# Patient Record
Sex: FEMALE | Race: WHITE | NOT HISPANIC OR LATINO | Employment: FULL TIME
[De-identification: names, ages, dates, MRNs, and addresses within clinical notes are randomized per-mention and may not be internally consistent; named-entity substitution may affect disease eponyms.]

---

## 2017-01-04 ENCOUNTER — RECORDS - HEALTHEAST (OUTPATIENT)
Dept: ADMINISTRATIVE | Facility: OTHER | Age: 47
End: 2017-01-04

## 2017-01-04 LAB
HPV_EXT - HISTORICAL: NORMAL
PAP SMEAR - HIM PATIENT REPORTED: NORMAL

## 2017-01-05 ENCOUNTER — RECORDS - HEALTHEAST (OUTPATIENT)
Dept: ADMINISTRATIVE | Facility: OTHER | Age: 47
End: 2017-01-05

## 2017-01-18 ENCOUNTER — HOSPITAL ENCOUNTER (OUTPATIENT)
Dept: MAMMOGRAPHY | Facility: CLINIC | Age: 47
Discharge: HOME | End: 2017-01-18
Attending: OBSTETRICS & GYNECOLOGY

## 2017-01-18 DIAGNOSIS — Z12.31 VISIT FOR SCREENING MAMMOGRAM: ICD-10-CM

## 2017-02-05 ENCOUNTER — COMMUNICATION - HEALTHEAST (OUTPATIENT)
Dept: FAMILY MEDICINE | Facility: CLINIC | Age: 47
End: 2017-02-05

## 2017-02-05 DIAGNOSIS — J45.30 MILD PERSISTENT ASTHMA, UNCOMPLICATED: ICD-10-CM

## 2017-02-17 ENCOUNTER — OFFICE VISIT - HEALTHEAST (OUTPATIENT)
Dept: FAMILY MEDICINE | Facility: CLINIC | Age: 47
End: 2017-02-17

## 2017-02-17 DIAGNOSIS — J45.30 MILD PERSISTENT ASTHMA WITHOUT COMPLICATION: ICD-10-CM

## 2017-02-17 DIAGNOSIS — M79.604 PAIN OF BACK AND RIGHT LOWER EXTREMITY: ICD-10-CM

## 2017-02-17 DIAGNOSIS — G43.809 OTHER MIGRAINE WITHOUT STATUS MIGRAINOSUS, NOT INTRACTABLE: ICD-10-CM

## 2017-02-17 DIAGNOSIS — M54.9 PAIN OF BACK AND RIGHT LOWER EXTREMITY: ICD-10-CM

## 2017-02-17 ASSESSMENT — MIFFLIN-ST. JEOR: SCORE: 1066.28

## 2017-10-06 ENCOUNTER — RECORDS - HEALTHEAST (OUTPATIENT)
Dept: ADMINISTRATIVE | Facility: OTHER | Age: 47
End: 2017-10-06

## 2018-01-31 ENCOUNTER — OFFICE VISIT - HEALTHEAST (OUTPATIENT)
Dept: FAMILY MEDICINE | Facility: CLINIC | Age: 48
End: 2018-01-31

## 2018-01-31 DIAGNOSIS — E78.00 HYPERCHOLESTEREMIA: ICD-10-CM

## 2018-01-31 DIAGNOSIS — Z12.31 VISIT FOR SCREENING MAMMOGRAM: ICD-10-CM

## 2018-01-31 DIAGNOSIS — J45.30 MILD PERSISTENT ASTHMA WITHOUT COMPLICATION: ICD-10-CM

## 2018-01-31 DIAGNOSIS — G47.09 OTHER INSOMNIA: ICD-10-CM

## 2018-01-31 DIAGNOSIS — Z12.11 SCREEN FOR COLON CANCER: ICD-10-CM

## 2018-04-06 ENCOUNTER — RECORDS - HEALTHEAST (OUTPATIENT)
Dept: ADMINISTRATIVE | Facility: OTHER | Age: 48
End: 2018-04-06

## 2018-09-11 ENCOUNTER — COMMUNICATION - HEALTHEAST (OUTPATIENT)
Dept: FAMILY MEDICINE | Facility: CLINIC | Age: 48
End: 2018-09-11

## 2018-10-12 ENCOUNTER — RECORDS - HEALTHEAST (OUTPATIENT)
Dept: ADMINISTRATIVE | Facility: OTHER | Age: 48
End: 2018-10-12

## 2019-01-11 ENCOUNTER — RECORDS - HEALTHEAST (OUTPATIENT)
Dept: ADMINISTRATIVE | Facility: OTHER | Age: 49
End: 2019-01-11

## 2019-01-24 ENCOUNTER — OFFICE VISIT - HEALTHEAST (OUTPATIENT)
Dept: FAMILY MEDICINE | Facility: CLINIC | Age: 49
End: 2019-01-24

## 2019-01-24 DIAGNOSIS — E78.00 HYPERCHOLESTEREMIA: ICD-10-CM

## 2019-01-24 DIAGNOSIS — J45.30 MILD PERSISTENT ASTHMA WITHOUT COMPLICATION: ICD-10-CM

## 2019-01-24 DIAGNOSIS — F10.21 ALCOHOL DEPENDENCE, IN REMISSION (H): ICD-10-CM

## 2019-01-24 DIAGNOSIS — R79.89 ABNORMAL LFTS: ICD-10-CM

## 2019-01-24 DIAGNOSIS — G43.809 OTHER MIGRAINE WITHOUT STATUS MIGRAINOSUS, NOT INTRACTABLE: ICD-10-CM

## 2019-01-24 LAB
ALBUMIN SERPL-MCNC: 4.3 G/DL (ref 3.5–5)
ALP SERPL-CCNC: 72 U/L (ref 45–120)
ALT SERPL W P-5'-P-CCNC: 19 U/L (ref 0–45)
ANION GAP SERPL CALCULATED.3IONS-SCNC: 12 MMOL/L (ref 5–18)
AST SERPL W P-5'-P-CCNC: 22 U/L (ref 0–40)
BILIRUB SERPL-MCNC: 0.5 MG/DL (ref 0–1)
BUN SERPL-MCNC: 20 MG/DL (ref 8–22)
CALCIUM SERPL-MCNC: 9.6 MG/DL (ref 8.5–10.5)
CHLORIDE BLD-SCNC: 104 MMOL/L (ref 98–107)
CHOLEST SERPL-MCNC: 236 MG/DL
CO2 SERPL-SCNC: 25 MMOL/L (ref 22–31)
CREAT SERPL-MCNC: 0.84 MG/DL (ref 0.6–1.1)
ERYTHROCYTE [DISTWIDTH] IN BLOOD BY AUTOMATED COUNT: 12.3 % (ref 11–14.5)
FASTING STATUS PATIENT QL REPORTED: NO
GFR SERPL CREATININE-BSD FRML MDRD: >60 ML/MIN/1.73M2
GLUCOSE BLD-MCNC: 99 MG/DL (ref 70–125)
HCT VFR BLD AUTO: 41.2 % (ref 35–47)
HDLC SERPL-MCNC: 75 MG/DL
HGB BLD-MCNC: 13.4 G/DL (ref 12–16)
LDLC SERPL CALC-MCNC: 151 MG/DL
MCH RBC QN AUTO: 31.1 PG (ref 27–34)
MCHC RBC AUTO-ENTMCNC: 32.6 G/DL (ref 32–36)
MCV RBC AUTO: 95 FL (ref 80–100)
PLATELET # BLD AUTO: 347 THOU/UL (ref 140–440)
PMV BLD AUTO: 7.6 FL (ref 7–10)
POTASSIUM BLD-SCNC: 4.1 MMOL/L (ref 3.5–5)
PROT SERPL-MCNC: 7 G/DL (ref 6–8)
RBC # BLD AUTO: 4.32 MILL/UL (ref 3.8–5.4)
SODIUM SERPL-SCNC: 141 MMOL/L (ref 136–145)
TRIGL SERPL-MCNC: 49 MG/DL
WBC: 4.7 THOU/UL (ref 4–11)

## 2019-01-26 ENCOUNTER — COMMUNICATION - HEALTHEAST (OUTPATIENT)
Dept: FAMILY MEDICINE | Facility: CLINIC | Age: 49
End: 2019-01-26

## 2019-01-29 ENCOUNTER — COMMUNICATION - HEALTHEAST (OUTPATIENT)
Dept: FAMILY MEDICINE | Facility: CLINIC | Age: 49
End: 2019-01-29

## 2019-02-04 ENCOUNTER — RECORDS - HEALTHEAST (OUTPATIENT)
Dept: ADMINISTRATIVE | Facility: OTHER | Age: 49
End: 2019-02-04

## 2019-02-05 ENCOUNTER — HOSPITAL ENCOUNTER (OUTPATIENT)
Dept: MAMMOGRAPHY | Facility: CLINIC | Age: 49
Discharge: HOME | End: 2019-02-05
Attending: OBSTETRICS & GYNECOLOGY

## 2019-02-05 DIAGNOSIS — Z12.31 VISIT FOR SCREENING MAMMOGRAM: ICD-10-CM

## 2019-02-06 ENCOUNTER — COMMUNICATION - HEALTHEAST (OUTPATIENT)
Dept: MAMMOGRAPHY | Facility: CLINIC | Age: 49
End: 2019-02-06

## 2019-02-06 ENCOUNTER — RECORDS - HEALTHEAST (OUTPATIENT)
Dept: ADMINISTRATIVE | Facility: OTHER | Age: 49
End: 2019-02-06

## 2019-02-07 ENCOUNTER — HOSPITAL ENCOUNTER (OUTPATIENT)
Dept: MAMMOGRAPHY | Facility: CLINIC | Age: 49
Discharge: HOME | End: 2019-02-07
Attending: OBSTETRICS & GYNECOLOGY

## 2019-02-07 DIAGNOSIS — N64.89 BREAST ASYMMETRY: ICD-10-CM

## 2019-02-18 ENCOUNTER — RECORDS - HEALTHEAST (OUTPATIENT)
Dept: ADMINISTRATIVE | Facility: OTHER | Age: 49
End: 2019-02-18

## 2019-04-08 ENCOUNTER — COMMUNICATION - HEALTHEAST (OUTPATIENT)
Dept: FAMILY MEDICINE | Facility: CLINIC | Age: 49
End: 2019-04-08

## 2019-04-08 DIAGNOSIS — J45.30 MILD PERSISTENT ASTHMA WITHOUT COMPLICATION: ICD-10-CM

## 2019-04-12 ENCOUNTER — RECORDS - HEALTHEAST (OUTPATIENT)
Dept: ADMINISTRATIVE | Facility: OTHER | Age: 49
End: 2019-04-12

## 2019-06-04 ENCOUNTER — OFFICE VISIT - HEALTHEAST (OUTPATIENT)
Dept: FAMILY MEDICINE | Facility: CLINIC | Age: 49
End: 2019-06-04

## 2019-06-04 DIAGNOSIS — M25.551 HIP PAIN, RIGHT: ICD-10-CM

## 2019-06-04 DIAGNOSIS — F10.21 ALCOHOL DEPENDENCE, IN REMISSION (H): ICD-10-CM

## 2019-06-04 DIAGNOSIS — G43.809 OTHER MIGRAINE WITHOUT STATUS MIGRAINOSUS, NOT INTRACTABLE: ICD-10-CM

## 2019-06-04 DIAGNOSIS — J45.30 MILD PERSISTENT ASTHMA WITHOUT COMPLICATION: ICD-10-CM

## 2019-06-04 DIAGNOSIS — Z01.810 PREOP CARDIOVASCULAR EXAM: ICD-10-CM

## 2019-06-04 ASSESSMENT — MIFFLIN-ST. JEOR: SCORE: 1058.34

## 2019-06-18 ENCOUNTER — COMMUNICATION - HEALTHEAST (OUTPATIENT)
Dept: FAMILY MEDICINE | Facility: CLINIC | Age: 49
End: 2019-06-18

## 2019-07-02 ENCOUNTER — OFFICE VISIT - HEALTHEAST (OUTPATIENT)
Dept: FAMILY MEDICINE | Facility: CLINIC | Age: 49
End: 2019-07-02

## 2019-07-02 DIAGNOSIS — Z51.89 VISIT FOR WOUND CHECK: ICD-10-CM

## 2019-07-02 DIAGNOSIS — Z98.890 S/P HIP ARTHROSCOPY: ICD-10-CM

## 2019-07-08 ENCOUNTER — RECORDS - HEALTHEAST (OUTPATIENT)
Dept: ADMINISTRATIVE | Facility: OTHER | Age: 49
End: 2019-07-08

## 2019-08-05 ENCOUNTER — RECORDS - HEALTHEAST (OUTPATIENT)
Dept: ADMINISTRATIVE | Facility: OTHER | Age: 49
End: 2019-08-05

## 2019-09-23 ENCOUNTER — RECORDS - HEALTHEAST (OUTPATIENT)
Dept: ADMINISTRATIVE | Facility: OTHER | Age: 49
End: 2019-09-23

## 2019-10-11 ENCOUNTER — RECORDS - HEALTHEAST (OUTPATIENT)
Dept: ADMINISTRATIVE | Facility: OTHER | Age: 49
End: 2019-10-11

## 2020-04-17 ENCOUNTER — RECORDS - HEALTHEAST (OUTPATIENT)
Dept: ADMINISTRATIVE | Facility: OTHER | Age: 50
End: 2020-04-17

## 2020-06-16 ENCOUNTER — OFFICE VISIT - HEALTHEAST (OUTPATIENT)
Dept: FAMILY MEDICINE | Facility: CLINIC | Age: 50
End: 2020-06-16

## 2020-06-16 DIAGNOSIS — J45.30 MILD PERSISTENT ASTHMA WITHOUT COMPLICATION: ICD-10-CM

## 2020-06-16 DIAGNOSIS — F10.21 ALCOHOL DEPENDENCE, IN REMISSION (H): ICD-10-CM

## 2020-06-16 RX ORDER — FLUOXETINE 10 MG/1
CAPSULE ORAL
Status: SHIPPED | COMMUNITY
Start: 2020-05-30 | End: 2022-06-08

## 2020-06-16 RX ORDER — NALTREXONE HYDROCHLORIDE 50 MG/1
TABLET, FILM COATED ORAL
Status: SHIPPED | COMMUNITY
Start: 2020-06-03 | End: 2022-06-08

## 2020-06-16 RX ORDER — PRAZOSIN HYDROCHLORIDE 2 MG/1
CAPSULE ORAL
Status: SHIPPED | COMMUNITY
Start: 2020-06-03

## 2020-06-16 ASSESSMENT — PATIENT HEALTH QUESTIONNAIRE - PHQ9: SUM OF ALL RESPONSES TO PHQ QUESTIONS 1-9: 0

## 2020-06-16 ASSESSMENT — ANXIETY QUESTIONNAIRES
GAD7 TOTAL SCORE: 0
1. FEELING NERVOUS, ANXIOUS, OR ON EDGE: NOT AT ALL
6. BECOMING EASILY ANNOYED OR IRRITABLE: NOT AT ALL
3. WORRYING TOO MUCH ABOUT DIFFERENT THINGS: NOT AT ALL
2. NOT BEING ABLE TO STOP OR CONTROL WORRYING: NOT AT ALL
5. BEING SO RESTLESS THAT IT IS HARD TO SIT STILL: NOT AT ALL
4. TROUBLE RELAXING: NOT AT ALL
IF YOU CHECKED OFF ANY PROBLEMS ON THIS QUESTIONNAIRE, HOW DIFFICULT HAVE THESE PROBLEMS MADE IT FOR YOU TO DO YOUR WORK, TAKE CARE OF THINGS AT HOME, OR GET ALONG WITH OTHER PEOPLE: NOT DIFFICULT AT ALL
7. FEELING AFRAID AS IF SOMETHING AWFUL MIGHT HAPPEN: NOT AT ALL

## 2020-09-29 ENCOUNTER — RECORDS - HEALTHEAST (OUTPATIENT)
Dept: HEALTH INFORMATION MANAGEMENT | Facility: CLINIC | Age: 50
End: 2020-09-29

## 2020-10-08 ENCOUNTER — COMMUNICATION - HEALTHEAST (OUTPATIENT)
Dept: FAMILY MEDICINE | Facility: CLINIC | Age: 50
End: 2020-10-08

## 2020-10-08 DIAGNOSIS — J45.30 MILD PERSISTENT ASTHMA WITHOUT COMPLICATION: ICD-10-CM

## 2020-10-09 RX ORDER — ALBUTEROL SULFATE 90 UG/1
2 AEROSOL, METERED RESPIRATORY (INHALATION) EVERY 4 HOURS PRN
Qty: 18 G | Refills: 5 | Status: SHIPPED | OUTPATIENT
Start: 2020-10-09 | End: 2022-06-08

## 2020-10-19 ENCOUNTER — RECORDS - HEALTHEAST (OUTPATIENT)
Dept: ADMINISTRATIVE | Facility: OTHER | Age: 50
End: 2020-10-19

## 2020-12-17 ENCOUNTER — HOSPITAL ENCOUNTER (OUTPATIENT)
Dept: MAMMOGRAPHY | Facility: CLINIC | Age: 50
Discharge: HOME | End: 2020-12-17
Attending: FAMILY MEDICINE

## 2020-12-17 DIAGNOSIS — Z12.31 VISIT FOR SCREENING MAMMOGRAM: ICD-10-CM

## 2020-12-30 ENCOUNTER — HOSPITAL ENCOUNTER (OUTPATIENT)
Dept: ULTRASOUND IMAGING | Facility: CLINIC | Age: 50
Discharge: HOME | End: 2020-12-30
Attending: FAMILY MEDICINE

## 2020-12-30 ENCOUNTER — HOSPITAL ENCOUNTER (OUTPATIENT)
Dept: MAMMOGRAPHY | Facility: CLINIC | Age: 50
Discharge: HOME | End: 2020-12-30
Attending: FAMILY MEDICINE

## 2020-12-30 DIAGNOSIS — N64.89 BREAST ASYMMETRY: ICD-10-CM

## 2021-01-05 ENCOUNTER — COMMUNICATION - HEALTHEAST (OUTPATIENT)
Dept: FAMILY MEDICINE | Facility: CLINIC | Age: 51
End: 2021-01-05

## 2021-01-05 DIAGNOSIS — N64.89 BREAST ASYMMETRY: ICD-10-CM

## 2021-01-14 ENCOUNTER — RECORDS - HEALTHEAST (OUTPATIENT)
Dept: ADMINISTRATIVE | Facility: OTHER | Age: 51
End: 2021-01-14

## 2021-01-15 ENCOUNTER — RECORDS - HEALTHEAST (OUTPATIENT)
Dept: ADMINISTRATIVE | Facility: OTHER | Age: 51
End: 2021-01-15

## 2021-04-20 ENCOUNTER — RECORDS - HEALTHEAST (OUTPATIENT)
Dept: ADMINISTRATIVE | Facility: OTHER | Age: 51
End: 2021-04-20

## 2021-05-24 ENCOUNTER — RECORDS - HEALTHEAST (OUTPATIENT)
Dept: ADMINISTRATIVE | Facility: CLINIC | Age: 51
End: 2021-05-24

## 2021-05-26 ASSESSMENT — PATIENT HEALTH QUESTIONNAIRE - PHQ9: SUM OF ALL RESPONSES TO PHQ QUESTIONS 1-9: 0

## 2021-05-27 ENCOUNTER — RECORDS - HEALTHEAST (OUTPATIENT)
Dept: ADMINISTRATIVE | Facility: OTHER | Age: 51
End: 2021-05-27

## 2021-05-27 ENCOUNTER — AMBULATORY - HEALTHEAST (OUTPATIENT)
Dept: LAB | Facility: CLINIC | Age: 51
End: 2021-05-27

## 2021-05-27 ENCOUNTER — AMBULATORY - HEALTHEAST (OUTPATIENT)
Dept: FAMILY MEDICINE | Facility: CLINIC | Age: 51
End: 2021-05-27

## 2021-05-27 DIAGNOSIS — F10.20 ALCOHOL DEPENDENCE (H): ICD-10-CM

## 2021-05-27 NOTE — TELEPHONE ENCOUNTER
Refills remain at the pharmacy. Refill requested too soon.    Fan Pool, RN BSN, [facility] Triage/Med Refill

## 2021-05-28 ENCOUNTER — RECORDS - HEALTHEAST (OUTPATIENT)
Dept: ADMINISTRATIVE | Facility: CLINIC | Age: 51
End: 2021-05-28

## 2021-05-28 ASSESSMENT — ASTHMA QUESTIONNAIRES: ACT_TOTALSCORE: 25

## 2021-05-28 ASSESSMENT — ANXIETY QUESTIONNAIRES: GAD7 TOTAL SCORE: 0

## 2021-05-29 ENCOUNTER — RECORDS - HEALTHEAST (OUTPATIENT)
Dept: ADMINISTRATIVE | Facility: CLINIC | Age: 51
End: 2021-05-29

## 2021-05-29 NOTE — TELEPHONE ENCOUNTER
Question following Office Visit  When did you see your provider: 6/4/19  What is your question: Patient is calling to say [facility] has still not received my pre-op and my surgery is scheduled on 6/25/19 please fax to [phone].  Okay to leave a detailed message: Yes

## 2021-05-29 NOTE — PROGRESS NOTES
Preoperative Exam    Scheduled Procedure: right hip arthroscopy  Surgery Date:  6/25/19  Surgery Location: [facility] [phone]  Surgeon:  Dr. Galvez    Assessment/Plan:     1. Preop cardiovascular exam  Preoperative cardiovascular examination completed.  Right hip pain associated with femoral acetabular impingement and labrum tear.  Scheduled right hip arthroscopy June 25, 2019 as noted.  No contraindication identified to schedule procedure.    2. Hip pain, right  As above, chronic right hip pain associate with femoral acetabular impingement with a labrum tear.  Scheduled right hip arthroscopy noted.    3. Mild persistent asthma without complication  Mild persistent asthma remains stable on Advair 250/50 using 1 puff twice daily.  Asthma control test 24 out of 25.    4. Other migraine without status migrainosus, not intractable  Has utilized aspirin 81 mg daily for migraine headache prophylaxis.  Will hold off 10 days prior to scheduled procedure otherwise may resume following.    5. Alcohol dependence, in remission (H)  History of alcohol dependence and remains in remission.  Routine precautions regarding postoperative analgesic use etc.        Surgical Procedure Risk: Low (reported cardiac risk generally < 1%)  Have you had prior anesthesia?: Yes  Have you or any family members had a previous anesthesia reaction:  No  Do you or any family members have a history of a clotting or bleeding disorder?: Yes: father - factor 5 leiden  Cardiac Risk Assessment: no increased risk for major cardiac complications    Patient approved for surgery with general or local anesthesia.      Functional Status: Independent  Patient plans to recover at home with family.     Subjective:      Nova Sanhces is a 49 y.o. female who presents for a preoperative consultation.  Right hip pain.  Ongoing concern.  Often worse with running on an incline.  Describes hip as locking.  Describes having femoral acetabular impingement  "with labrum tear.  Scheduled arthroscopy 2019.  Asthma remains stable.  Continues Advair 250/50 using 1 puff twice daily.  No recent asthma exacerbation.  Migraine headache history.  Better with aspirin 81 mg daily prophylactically.  History of chemical dependency noted with current remission ongoing.  Denies recent illness.    All other systems reviewed and are negative, other than those listed in the HPI.     \"Cm\" x 92   2 daughters - Chata and Deann   Doesn't talk to parents at all...   Dad - DVT (Factor V Leiden)   Mom - high chol, osteoarthritis   2 bros - one  due to suicide 8/15/2006 (age 37), depression, EtOH   1 sis -   [employer]   Work: Pharmacist, [employer]   s/p club foot release, T & A, plantar fascial release,  x 2   No smoke   No EtOH: Per patient, occasional EtOH only... (see above ? EtOHism) Followed through [facility] ([phone])   BCC on chest and both arms (f/u 10/18/13 at [facility] Dr. Crystal Lopez) Merena IUD placed in Dec, 2012   [facility] 10, 2018 for physical exam with , TG 76, HDL 77, and .    Pertinent History  Do you have difficulty breathing or chest pain after walking up a flight of stairs: No  History of obstructive sleep apnea: No  Steroid use in the last 6 months: Yes: right hip cortisone injection and uses oral inhaler  Frequent Aspirin/NSAID use: Yes: 81 mg at night   Prior Blood Transfusion: No  Prior Blood Transfusion Reaction: No  If for some reason prior to, during or after the procedure, if it is medically indicated, would you be willing to have a blood transfusion?:  There is no transfusion refusal.    Current Outpatient Medications   Medication Sig Dispense Refill     albuterol (PROAIR HFA;PROVENTIL HFA;VENTOLIN HFA) 90 mcg/actuation inhaler Inhale 2 puffs every 4 (four) hours as needed for wheezing. 18 g 5     " aspirin 81 mg chewable tablet Chew 81 mg daily.       calcium carbonate-vit D3-min (CALCIUM-VITAMIN D) 600 mg calcium- 400 unit Tab Take 1 tablet by mouth 2 (two) times a day.       diphenhydrAMINE (BENADRYL) 50 MG capsule Take 50 mg by mouth at bedtime. PT TAKING 100 MG       fluticasone-salmeterol (ADVAIR) 250-50 mcg/dose DISKUS Inhale 1 puff 2 (two) times a day. 180 puff 3     MULTIVIT &MINERALS/FERROUS FUM (MULTI VITAMIN ORAL) Take 1 tablet by mouth daily.       No current facility-administered medications for this visit.         No Known Allergies    Patient Active Problem List   Diagnosis     Post-traumatic Insomnia     Post-traumatic Stress Disorder     Other insomnia     Migraine Headache     Alcohol dependence, in remission (H)     Mild persistent asthma     Allergy to dust     Varicose veins     Mild persistent asthma without complication     Pain of back and right lower extremity       Past Medical History:   Diagnosis Date     Asthma     exercise induced     Substance abuse (H)     h/o EtOHism       Past Surgical History:   Procedure Laterality Date     GA CAPSULOTOMY MIDFOOT,EXTENSIVE      Description: Midfoot Capsulotomy And Posterior Release W/ Tendon Lengthen;  Recorded: 04/01/2009;     GA KNEE SCOPE,DIAGNOSTIC      Description: Arthroscopy Knee;  Recorded: 04/01/2009;     GA RAD EXCIS PLANTAR FASCIA      Description: Radical Plantar Fasciectomy;  Recorded: 04/01/2009;     TONSILLECTOMY AND ADENOIDECTOMY         Social History     Socioeconomic History     Marital status:      Spouse name: Not on file     Number of children: Not on file     Years of education: Not on file     Highest education level: Not on file   Occupational History     Not on file   Social Needs     Financial resource strain: Not on file     Food insecurity:     Worry: Not on file     Inability: Not on file     Transportation needs:     Medical: Not on file     Non-medical: Not on file   Tobacco Use     Smoking status:  "Never Smoker     Smokeless tobacco: Never Used   Substance and Sexual Activity     Alcohol use: No     Drug use: No     Sexual activity: Yes     Partners: Male     Comment:    Lifestyle     Physical activity:     Days per week: Not on file     Minutes per session: Not on file     Stress: Not on file   Relationships     Social connections:     Talks on phone: Not on file     Gets together: Not on file     Attends Anabaptist service: Not on file     Active member of club or organization: Not on file     Attends meetings of clubs or organizations: Not on file     Relationship status: Not on file     Intimate partner violence:     Fear of current or ex partner: Not on file     Emotionally abused: Not on file     Physically abused: Not on file     Forced sexual activity: Not on file   Other Topics Concern     Not on file   Social History Narrative     Not on file       Patient Care Team:  Serafin Cobb MD as PCP - General          Objective:     Vitals:    06/04/19 1000   BP: 100/70   Pulse: (!) 103   SpO2: 100%   Weight: 112 lb (50.8 kg)   Height: 5' 1.5\" (1.562 m)         Physical Exam:  Physical Exam     General Appearance:  Alert, cooperative, no distress, appears stated age   HEENT:  Normal.  No acute findings.   Neck: Supple, symmetrical, no adenopathy.   Lungs:   Clear to auscultation bilaterally, respirations unlabored.  No expiratory wheeze or inspiratory crackles noted.   Heart:  Regular rate and rhythm, S1, S2 normal, no murmur, rub or gallop   Abdomen:   Soft, non-tender, positive bowel sounds, no masses, no organomegaly   Extremities: Extremities normal.  No cyanosis or edema   Skin: Warm, dry.  Skin color, texture, turgor normal, no rashes or lesions   Neurologic: Nonfocal.          There are no Patient Instructions on file for this visit.      Labs:  No labs were ordered during this visit     Results for orders placed or performed in visit on 01/24/19   Comprehensive Metabolic Panel   Result " Value Ref Range    Sodium 141 136 - 145 mmol/L    Potassium 4.1 3.5 - 5.0 mmol/L    Chloride 104 98 - 107 mmol/L    CO2 25 22 - 31 mmol/L    Anion Gap, Calculation 12 5 - 18 mmol/L    Glucose 99 70 - 125 mg/dL    BUN 20 8 - 22 mg/dL    Creatinine 0.84 0.60 - 1.10 mg/dL    GFR MDRD Af Amer >60 >60 mL/min/1.73m2    GFR MDRD Non Af Amer >60 >60 mL/min/1.73m2    Bilirubin, Total 0.5 0.0 - 1.0 mg/dL    Calcium 9.6 8.5 - 10.5 mg/dL    Protein, Total 7.0 6.0 - 8.0 g/dL    Albumin 4.3 3.5 - 5.0 g/dL    Alkaline Phosphatase 72 45 - 120 U/L    AST 22 0 - 40 U/L    ALT 19 0 - 45 U/L      Lab Results   Component Value Date    WBC 4.7 01/24/2019    HGB 13.4 01/24/2019    HCT 41.2 01/24/2019    MCV 95 01/24/2019     01/24/2019        Immunization History   Administered Date(s) Administered     DT (pediatric) 11/01/2004     Influenza, Seasonal, Inj PF IIV3 10/02/2014     Influenza, inj, historic,unspecified 09/01/2015, 10/17/2016, 10/17/2017, 11/11/2018     Influenza,seasonal quad, PF, 36+MOS 09/14/2015, 10/06/2016     Pneumo Polysac 23-V 01/07/2015     Td, Adult, Absorbed 11/01/2004     Td,adult,historic,unspecified 11/01/2004     Tdap 10/23/2008, 10/09/2013, 07/09/2018           Electronically signed by Serafin Cobb MD 06/04/19 9:52 AM

## 2021-05-30 VITALS — BODY MASS INDEX: 20.61 KG/M2 | HEIGHT: 62 IN | WEIGHT: 112 LBS

## 2021-05-30 NOTE — PROGRESS NOTES
Subjective:      Patient ID: Nova Sanches is a 49 y.o. female.    Chief Complaint:    HPI  Nova Sanches is a 49 y.o. female who is status post a right hip arthroscopy on 6/25/2019 presents today complaining of irritation around the surgical incision site and 2 small blisters noticed on 6/30.  Patient states that she has been using some older nonstick wound pads that she has had in her house.  These were at least 2 or 3 years old and there made of polyester and dennis.  She has been using paper tape on the edges of the nonstick dressing and has not had too much tension on those wounds.  The area of reactivity is in contact with the nonstick dressings and the outline is matching with the placement of those dressings.  She is here ostensibly because she is concerned this may cause infection into the wound.  At this time she does not have constitutional symptoms to include fever chills night sweats or fatigue and no discharge at the wound site.      Past Medical History:   Diagnosis Date     Asthma     exercise induced     Substance abuse (H)     h/o EtOHism       Past Surgical History:   Procedure Laterality Date     MA CAPSULOTOMY MIDFOOT,EXTENSIVE      Description: Midfoot Capsulotomy And Posterior Release W/ Tendon Lengthen;  Recorded: 04/01/2009;     MA KNEE SCOPE,DIAGNOSTIC      Description: Arthroscopy Knee;  Recorded: 04/01/2009;     MA RAD EXCIS PLANTAR FASCIA      Description: Radical Plantar Fasciectomy;  Recorded: 04/01/2009;     TONSILLECTOMY AND ADENOIDECTOMY         Family History   Problem Relation Age of Onset     Hyperlipidemia Mother      Deep vein thrombosis Father         Factor V Leiden mutation     Factor V Leiden deficiency Father      Graves' disease Sister      Stroke Maternal Grandmother        Social History     Tobacco Use     Smoking status: Never Smoker     Smokeless tobacco: Never Used   Substance Use Topics     Alcohol use: No     Drug use: No       Review of Systems  As  above in HPI, otherwise balance of Review of Systems are negative.    Objective:     BP 92/56 (Patient Site: Right Arm, Patient Position: Sitting, Cuff Size: Adult Regular)   Pulse (!) 104   Temp 98.6  F (37  C) (Oral)   Resp 20   Wt 113 lb 4.8 oz (51.4 kg)   SpO2 100%   BMI 21.06 kg/m      Physical Exam  General: Patient is resting comfortably no acute distress is afebrile  HEENT: Head is normocephalic atraumatic   Skin: Without rash non-diaphoretic  Musculoskeletal: The surgical incision site is nonreactive there is no wound dehiscence and the sutures are still in place.  There is some what appears to be a contact dermatitis around the certain areas in the outline of the nonstick pads.  There are 2 larger fluid-filled bulla on the more medial side of the dressed area of the wound.  This again appears to be consistent with a contact allergen.    Assessment:     Procedures    The primary encounter diagnosis was Visit for wound check. A diagnosis of S/P hip arthroscopy was also pertinent to this visit.    Plan:     Advised the patient that I do not think that this is an infection.  It looks like it is a contact allergen to the dressings.  We will have her discontinue the nonstick dressings that she had bought 2 years ago that may be out of date or have some reactivity.  She is given the office dressings that are cotton for cotton gauze dressing.  She will continue using the paper tape and try not to pull on the tape to create pulling on the skin.  She will contact [facility] orthopedics for evaluation and treatment if any complication or new symptoms arise; otherwise have follow up as previously scheduled.

## 2021-06-01 ENCOUNTER — AMBULATORY - HEALTHEAST (OUTPATIENT)
Dept: LAB | Facility: CLINIC | Age: 51
End: 2021-06-01

## 2021-06-01 ENCOUNTER — RECORDS - HEALTHEAST (OUTPATIENT)
Dept: ADMINISTRATIVE | Facility: CLINIC | Age: 51
End: 2021-06-01

## 2021-06-01 VITALS — WEIGHT: 116 LBS | BODY MASS INDEX: 21.22 KG/M2

## 2021-06-01 DIAGNOSIS — F10.20 ALCOHOL DEPENDENCE (H): ICD-10-CM

## 2021-06-02 VITALS — BODY MASS INDEX: 20.3 KG/M2 | WEIGHT: 111 LBS

## 2021-06-03 VITALS — WEIGHT: 112 LBS | BODY MASS INDEX: 20.61 KG/M2 | HEIGHT: 62 IN

## 2021-06-03 VITALS — WEIGHT: 113.3 LBS | BODY MASS INDEX: 21.06 KG/M2

## 2021-06-05 LAB — ARUP MISCELLANEOUS TEST: NORMAL

## 2021-06-06 LAB
MISCELLANEOUS TEST DEPT. - HE HISTORICAL: NORMAL
PERFORMING LAB: NORMAL
SPECIMEN STATUS: NORMAL
TEST NAME: NORMAL

## 2021-06-08 NOTE — PROGRESS NOTES
"Nova Sanches is a 50 y.o. female who is being evaluated via a billable telephone visit.      The patient has been notified of following:     \"This telephone visit will be conducted via a call between you and your physician/provider. We have found that certain health care needs can be provided without the need for a physical exam.  This service lets us provide the care you need with a short phone conversation.  If a prescription is necessary we can send it directly to your pharmacy.  If lab work is needed we can place an order for that and you can then stop by our lab to have the test done at a later time.    Telephone visits are billed at different rates depending on your insurance coverage. During this emergency period, for some insurers they may be billed the same as an in-person visit.  Please reach out to your insurance provider with any questions.    If during the course of the call the physician/provider feels a telephone visit is not appropriate, you will not be charged for this service.\"    Patient has given verbal consent to a Telephone visit? Yes    What phone number would you like to be contacted at? [phone]    Patient would like to receive their AVS by AVS Preference: Mail a copy.     \"Cm\" x 92   2 daughters - Chata and Deann   Doesn't talk to parents at all...   Dad - DVT (Factor V Leiden)   Mom - high chol, osteoarthritis   2 bros - one  due to suicide 8/15/2006 (age 37), depression, EtOH   1 sis -   [employer]   Work: Pharmacist, [employer]   s/p club foot release, T & A, plantar fascial release,  x 2   No smoke   No EtOH: Per patient, occasional EtOH only... (see above ? EtOHism) Followed through [facility] ([phone])   BCC on chest and both arms (f/u 10/18/13 at [facility] Dr. Crystal Lopez) Merena IUD placed in Dec, 2012   [facility] January 10, 2018 for " "physical exam with , TG 76, HDL 77, and .    Additional provider notes: Virtual visit completed.  Needs refill on Advair 250/50 using 1 puff twice daily with albuterol metered-dose inhaler available on as-needed basis.  Not requiring.  Has not been running for exercise due to her hip issues now continuing improvement with combination jogging and bicycling for exercise.  When asked if requiring further refills.  She does admit to alcohol relapse in March, 2020 with subsequent residential treatment program completion in [place], [place] at [facility] from 5/14/20-6/11/20.  Will cut ties with \"family of origin\" and complete work with \"trauma therapist for \"prolonged exposure therapy\" on 7/2/20 .  Patient's  Cm is supportive.  He understands that therapy will likely increase patient anxiety as well as her desire to consume alcohol etc. however is hopeful that once she gets through this therapy that she will be able to move forward ongoing success in maintaining sobriety as well as management depression anxiety.  Patient is seen to [facility] and has a follow-up next week and continue his current use of fluoxetine, prazosin and naltrexone for maintenance of sobriety from alcohol.      Assessment/Plan:    1. Mild persistent asthma without complication  Provided refill on Advair and albuterol metered-dose inhalers.  Continues to do well without recent asthma exacerbation etc.  Will reassess at follow-up next 6 months likely.  Asthma control test result 25/25.  - fluticasone propion-salmeteroL (ADVAIR) 250-50 mcg/dose DISKUS; Inhale 1 puff 2 (two) times a day.  Dispense: 180 puff; Refill: 3  - albuterol (PROAIR HFA;PROVENTIL HFA;VENTOLIN HFA) 90 mcg/actuation inhaler; Inhale 2 puffs every 4 (four) hours as needed for wheezing.  Dispense: 18 g; Refill: 5    2. Alcohol dependence, in remission (H)  Discussed recent relapse with history of alcoholism.  Continued sobriety now since May 2020 at time of " completion of 4-week residential treatment program in [facility].  We will continue to work with trauma therapist with described prolonged exposure therapy scheduled July 2, 2020.  Continues to follow to primary care for med management evaluation depression and anxiety.  PHQ 9 questionnaire 0 out of 27.        Phone call duration:  11 minutes    Serafin Cobb MD

## 2021-06-08 NOTE — PROGRESS NOTES
"Subjective:    Nova Sanches is seen today for recheck of underlying mild persistent asthma.  Feels that this has been stable.  Less endurance however when he comes to running.  We'll premedicate when she runs outside otherwise, run 2 or 3 miles.  He used to be able to participate in longer runs.  Has not had concerns for wheezing generally.  Had flu shot through work otherwise has had Pneumovax previously.  History migraine headaches without current issues.  Also has had right lower back pain that restricts her as well with running that it involves hip and perhaps posterior thigh.  No history of lumbar disc herniation or radiculopathy/sciatica described.  No ankle swelling.  No rash.  No fevers or chills.  No other specific injury or trauma.  Using stationary bike as well as doing yoga for exercise currently.  Comprehensive review of systems as above otherwise all negative.     \"Cm\" x 92   2 daughters - Chata and Deann   Doesn't talk to parents at all...   Dad - DVT (Factor V Leiden)   Mom - high chol, osteoarthritis   2 bros - one  due to suicide 8/15/2006 (age 37), depression, EtOH   1 [employer] -   [employer]   Work: Pharmacist, [employer]   s/p club foot release, T & A, plantar fascial release,  x 2   No smoke   No EtOH: Per patient, occasional EtOH only... (see above ? EtOHism) Followed through [facility] ([phone])   BCC on chest and both arms (f/u 10/18/13 at [facility] Dr. Crystal Lopez) Merena IUD placed in Dec, 2012   Sees [facility] for female exams     Past Surgical History:   Procedure Laterality Date     LA CAPSULOTOMY MIDFOOT,EXTENSIVE      Description: Midfoot Capsulotomy And Posterior Release W/ Tendon Lengthen;  Recorded: 2009;     LA KNEE SCOPE,DIAGNOSTIC      Description: Arthroscopy Knee;  Recorded: 2009;     LA RAD EXCIS PLANTAR FASCIA      Description: Radical Plantar Fasciectomy;  Recorded: " "04/01/2009;     TONSILLECTOMY AND ADENOIDECTOMY          Family History   Problem Relation Age of Onset     Hyperlipidemia Mother      Deep vein thrombosis Father      Factor V Leiden mutation     Factor V Leiden deficiency Father      Graves' disease Sister      Stroke Maternal Grandmother         Past Medical History:   Diagnosis Date     Asthma     exercise induced     Substance abuse     h/o EtOHism        Social History   Substance Use Topics     Smoking status: Never Smoker     Smokeless tobacco: None     Alcohol use No        Current Outpatient Prescriptions   Medication Sig Dispense Refill     albuterol (PROVENTIL HFA;VENTOLIN HFA) 90 mcg/actuation inhaler Inhale 2 puffs every 4 (four) hours as needed for wheezing. 18 g 5     aspirin 81 mg chewable tablet Chew 81 mg daily.       calcium carbonate-vit D3-min (CALCIUM-VITAMIN D) 600 mg calcium- 400 unit Tab Take 1 tablet by mouth 2 (two) times a day.       diphenhydrAMINE (BENADRYL) 50 MG capsule Take 50 mg by mouth bedtime.       fluticasone-salmeterol (ADVAIR) 250-50 mcg/dose DISKUS Inhale 1 puff 2 (two) times a day. 180 puff 3     MULTIVIT &MINERALS/FERROUS FUM (MULTI VITAMIN ORAL) Take 1 tablet by mouth daily.       No current facility-administered medications for this visit.           Objective:    Vitals:    02/17/17 1114   BP: (!) 84/60   Pulse: 88   Weight: 112 lb (50.8 kg)   Height: 5' 2\" (1.575 m)      Body mass index is 20.49 kg/(m^2).    Alert.  No apparent distress.  HEENT exam normal.  Neck supple.  Chest clear.  Cardiac exam regular.  Abdomen benign.  Extremities warm and dry.  DTRs symmetric.  Negative straight leg raise.  No trochanteric bursa tenderness.  No evidence for thoracic lumbar scoliosis.  No paravertebral muscle tenderness.  Lumbar spine opens up appropriately with spine flexion activity without evidence of ankylosing.      Assessment:    1. Mild persistent asthma without complication  albuterol (PROVENTIL HFA;VENTOLIN HFA) 90 " mcg/actuation inhaler    fluticasone-salmeterol (ADVAIR) 250-50 mcg/dose DISKUS   2. Pain of back and right lower extremity     3. Other migraine without status migrainosus, not intractable           Plan:    Mild persistent asthma currently stable we'll continue Advair 250/50 one puff twice daily.  Albuterol metered-dose inhaler on as-needed basis.  Refill provided.  Discussed lower back pain and right lower extremity discomfort question lumbar disc herniation with sciatica.  Hip range of motion is normal regarding hip flexion and extension as well as internal and external range of motion on exam today.  Avoidance of exacerbating triggers.  Back extension exercises discussed.  Symptomatic treatments for headache reviewed.  Immunizations up-to-date.  Asthma control test 25 out of 25.

## 2021-06-11 ENCOUNTER — RECORDS - HEALTHEAST (OUTPATIENT)
Dept: ADMINISTRATIVE | Facility: OTHER | Age: 51
End: 2021-06-11

## 2021-06-11 DIAGNOSIS — J45.30 MILD PERSISTENT ASTHMA WITHOUT COMPLICATION: ICD-10-CM

## 2021-06-12 NOTE — TELEPHONE ENCOUNTER
Refill Approved    Rx renewed per Medication Renewal Policy. Medication was last renewed on 6/116/20.    Bia Reddy, [facility] Connection Triage/Med Refill 10/9/2020     Requested Prescriptions   Pending Prescriptions Disp Refills     albuterol (PROAIR HFA;PROVENTIL HFA;VENTOLIN HFA) 90 mcg/actuation inhaler 18 g 5     Sig: Inhale 2 puffs every 4 (four) hours as needed for wheezing.       Albuterol/Levalbuterol Refill Protocol Passed - 10/8/2020  4:57 PM        Passed - PCP or prescribing provider visit in last year     Last office visit with prescriber/PCP: 1/24/2019 Serafin Cobb MD OR same dept: Visit date not found OR same specialty: 1/24/2019 Serafin Cobb MD Last physical: 6/4/2019       Next appt within 3 mo: Visit date not found  Next physical within 3 mo: Visit date not found  Prescriber OR PCP: Serafin Cobb MD  Last diagnosis associated with med order: 1. Mild persistent asthma without complication  - albuterol (PROAIR HFA;PROVENTIL HFA;VENTOLIN HFA) 90 mcg/actuation inhaler; Inhale 2 puffs every 4 (four) hours as needed for wheezing.  Dispense: 18 g; Refill: 5    If protocol passes may refill for 6 months if within 3 months of last provider visit (or a total of 9 months). If patient requesting >1 inhaler per month refill x 6 months and have patient make appointment with provider.

## 2021-06-14 ENCOUNTER — HOSPITAL ENCOUNTER (OUTPATIENT)
Dept: MAMMOGRAPHY | Facility: CLINIC | Age: 51
Discharge: HOME | End: 2021-06-14
Attending: FAMILY MEDICINE
Payer: COMMERCIAL

## 2021-06-14 DIAGNOSIS — N64.89 BREAST ASYMMETRY: ICD-10-CM

## 2021-06-14 NOTE — TELEPHONE ENCOUNTER
Reason contacted:  Orders request  Information relayed:    Patient is calling requesting Dr. Cobb place an order for a left breast mammogram.   She completed a mammogram and breast ultrasound on 12/30/2020 and the recommendation was to return in 6 months for a repeat left breast mammogram.    Order pended for approval.   Additional questions:  No  Further follow-up needed:  No  Okay to leave a detailed message:  Yes

## 2021-06-15 NOTE — PROGRESS NOTES
"Assessment:    1. Mild persistent asthma without complication  fluticasone-salmeterol (ADVAIR) 250-50 mcg/dose DISKUS    albuterol (PROAIR HFA;PROVENTIL HFA;VENTOLIN HFA) 90 mcg/actuation inhaler   2. Hypercholesteremia     3. Other insomnia     4. Visit for screening mammogram  Mammo Screening Bilateral   5. Screen for colon cancer  Ambulatory referral for Colonoscopy         Plan:    Mild persistent asthma, stable.  Asthma control test .  Refill provided on Advair 250/50 using 1 puff twice daily.  Refill on Ventolin metered-dose inhaler for as needed use.  Reviewed cholesterol elevation with total cholesterol 242 and HDL 77 and  January 10, 2018.  Ratio close to 3.  Ten-year cardiovascular risk less than 1%.  No indication for statin therapy.  Patient reassured with this.  Immunizations up-to-date.  Annual follow-up for asthma currently recommended.  Continues use of Benadryl  mg at bedtime for insomnia management, improved.        Subjective:    Nova MARILYN Vogtshannan is seen today for follow-up evaluation.  History of mild persistent asthma.  Well controlled.  Will occasionally premedicate prior to running outdoors for exercise otherwise uses Advair 250/50 1 puff twice daily with Ventolin MDI on an infrequent basis.  History of hypercholesterolemia with family history of hypercholesterolemia.  Insomnia improved with Benadryl  mg at bedtime as needed.  Does need to schedule follow-up mammogram and colonoscopy now that she is 48.  Comprehensive review of systems as above otherwise all negative.  Denies allergy concerns.  Had recently [facility] Dr[facility] January 10, 2018 for physical exam.     \"Cm\" x 92   2 daughters - Chata and Deann   Doesn't talk to parents at all...   Dad - DVT (Factor V Leiden)   Mom - high chol, osteoarthritis   2 bros - one  due to suicide 8/15/2006 (age 37), depression, EtOH   1 sis -   [employer]   Work: " Pharmacist, [employer]   s/p club foot release, T & A, plantar fascial release,  x 2   No smoke   No EtOH: Per patient, occasional EtOH only... (see above ? EtOHism) Followed through [facility] ([phone])   BCC on chest and both arms (f/u 10/18/13 at [facility] Dr. Crystal Lopez) Merena IUD placed in Dec, 2012   Sees [facility] for female exams     Recent admit for intoxiciation 09?... now treated through [facility] for EtOH dependence.    Past Surgical History:   Procedure Laterality Date     RI CAPSULOTOMY MIDFOOT,EXTENSIVE      Description: Midfoot Capsulotomy And Posterior Release W/ Tendon Lengthen;  Recorded: 2009;     RI KNEE SCOPE,DIAGNOSTIC      Description: Arthroscopy Knee;  Recorded: 2009;     RI RAD EXCIS PLANTAR FASCIA      Description: Radical Plantar Fasciectomy;  Recorded: 2009;     TONSILLECTOMY AND ADENOIDECTOMY          Family History   Problem Relation Age of Onset     Hyperlipidemia Mother      Deep vein thrombosis Father      Factor V Leiden mutation     Factor V Leiden deficiency Father      Graves' disease Sister      Stroke Maternal Grandmother         Past Medical History:   Diagnosis Date     Asthma     exercise induced     Substance abuse     h/o EtOHism        Social History   Substance Use Topics     Smoking status: Never Smoker     Smokeless tobacco: Never Used     Alcohol use No        Current Outpatient Prescriptions   Medication Sig Dispense Refill     albuterol (PROAIR HFA;PROVENTIL HFA;VENTOLIN HFA) 90 mcg/actuation inhaler Inhale 2 puffs every 4 (four) hours as needed for wheezing. 18 g 5     aspirin 81 mg chewable tablet Chew 81 mg daily.       calcium carbonate-vit D3-min (CALCIUM-VITAMIN D) 600 mg calcium- 400 unit Tab Take 1 tablet by mouth 2 (two) times a day.       diphenhydrAMINE (BENADRYL) 50 MG capsule Take 50 mg by mouth at bedtime. PT TAKING 100 MG        fluticasone-salmeterol (ADVAIR) 250-50 mcg/dose DISKUS Inhale 1 puff 2 (two) times a day. 180 puff 3     MULTIVIT &MINERALS/FERROUS FUM (MULTI VITAMIN ORAL) Take 1 tablet by mouth daily.       No current facility-administered medications for this visit.           Objective:    Vitals:    01/31/18 1203   BP: 90/60   Pulse: 88   Weight: 116 lb (52.6 kg)      Body mass index is 21.22 kg/(m^2).    Alert.  No apparent distress.  Chest clear.  No expiratory wheeze.  Symmetric expansion.  Cardiac exam regular.  Extremities warm and dry.        spoon/fed by clinician cup/self fed

## 2021-06-18 NOTE — LETTER
"Letter by Serafin Cobb MD at      Author: Serafin Cobb MD Service: -- Author Type: --    Filed:  Encounter Date: 1/26/2019 Status: (Other)       Nova Sanches  [address] [place] [id number]             January 26, 2019         Dear Ms. Sanches,    Below are the results from your recent visit:    Resulted Orders   Comprehensive Metabolic Panel   Result Value Ref Range    Sodium 141 136 - 145 mmol/L    Potassium 4.1 3.5 - 5.0 mmol/L    Chloride 104 98 - 107 mmol/L    CO2 25 22 - 31 mmol/L    Anion Gap, Calculation 12 5 - 18 mmol/L    Glucose 99 70 - 125 mg/dL    BUN 20 8 - 22 mg/dL    Creatinine 0.84 0.60 - 1.10 mg/dL    GFR MDRD Af Amer >60 >60 mL/min/1.73m2    GFR MDRD Non Af Amer >60 >60 mL/min/1.73m2    Bilirubin, Total 0.5 0.0 - 1.0 mg/dL    Calcium 9.6 8.5 - 10.5 mg/dL    Protein, Total 7.0 6.0 - 8.0 g/dL    Albumin 4.3 3.5 - 5.0 g/dL    Alkaline Phosphatase 72 45 - 120 U/L    AST 22 0 - 40 U/L    ALT 19 0 - 45 U/L    Narrative    Fasting Glucose reference range is 70-99 mg/dL per  American Diabetes Association (ADA) guidelines.   Lipid Cascade   Result Value Ref Range    Cholesterol 236 (H) <=199 mg/dL    Triglycerides 49 <=149 mg/dL    HDL Cholesterol 75 >=50 mg/dL    LDL Calculated 151 (H) <=129 mg/dL    Patient Fasting > 8hrs? No       Comment:      an orange at noon   HM2(CBC w/o Differential)   Result Value Ref Range    WBC 4.7 4.0 - 11.0 thou/uL    RBC 4.32 3.80 - 5.40 mill/uL    Hemoglobin 13.4 12.0 - 16.0 g/dL    Hematocrit 41.2 35.0 - 47.0 %    MCV 95 80 - 100 fL    MCH 31.1 27.0 - 34.0 pg    MCHC 32.6 32.0 - 36.0 g/dL    RDW 12.3 11.0 - 14.5 %    Platelets 347 140 - 440 thou/uL    MPV 7.6 7.0 - 10.0 fL       No evidence for diabetes noted.     Your kidney and liver tests were normal.     Your cholesterol results were only mildly elevated.  You have a lot of protective \"good cholesterol\" (i.e. HDL cholesterol).  Your 10-year cardiovascular risk for heart attack or stroke is 1%.  " Goal is < 7.5%.  Ensure regular exercise and healthy diet in order to further improve.   We will plan to recheck your labs while fasting in the next 12 months to ensure desired improvement.     Your complete blood count results were normal.  No evidence for anemia, etc.     Please call with questions or contact us using [employer].    Sincerely,        Electronically signed by Serfain Cobb MD

## 2021-06-22 ENCOUNTER — COMMUNICATION - HEALTHEAST (OUTPATIENT)
Dept: FAMILY MEDICINE | Facility: CLINIC | Age: 51
End: 2021-06-22

## 2021-06-23 NOTE — PROGRESS NOTES
Assessment/Plan:    1. Mild persistent asthma without complication  Mild persistent asthma without complication.  Refill on Advair 250/50 using 1 inhalation twice daily.  Albuterol metered-dose inhaler as needed.  Typically utilize prior to activities including jogging.  - fluticasone-salmeterol (ADVAIR) 250-50 mcg/dose DISKUS; Inhale 1 puff 2 (two) times a day.  Dispense: 180 puff; Refill: 3  - albuterol (PROAIR HFA;PROVENTIL HFA;VENTOLIN HFA) 90 mcg/actuation inhaler; Inhale 2 puffs every 4 (four) hours as needed for wheezing.  Dispense: 18 g; Refill: 5    2. Alcohol dependence, in remission (H)  Maintain sobriety from alcohol times 9 years.    3. Abnormal LFTs  Described LFT elevation history however discussion suggest primary issue associate with cholesterol elevation.  Lab monitoring was completed today.  - Comprehensive Metabolic Panel  - HM2(CBC w/o Differential)    4. Hypercholesteremia  Ensure lipid cascade at goal.  Will ensure 10-year cardiovascular risk less than 7.5% once results available.  Patient does have family history of hyperlipidemia in patient's maternal grandfather, mother and maternal uncle.  - Lipid Cascade    5. Other migraine without status migrainosus, not intractable  Migraine history without recent concern for worsening etc.    Patient scheduled to see gynecologist next Wednesday, January 30, 2019 for routine GYN cares.          Subjective:    Nova Sanches is seen today for follow-up evaluation.  Fasting.  Needs cholesterol levels checked.  Has had history of hypercholesterolemia in the past.  Family history of cholesterol elevation in maternal grandfather, mother and maternal uncle.  Using Advair 250/50 1 inhalation twice daily plus albuterol metered-dose inhaler on as-needed basis only for mild persistent asthma.  Enjoys running for physical activity.  Will premedicate with albuterol metered-dose inhaler.  Maintain sobriety times 9 years.  Denies recent illness.  Non-smoker.   "History of IgE positive dust mite allergy.  Avoids exacerbating triggers.  Comprehensive review of systems as above otherwise all negative.     \"Cm\" x 92   2 daughters - Chata and Deann   Doesn't talk to parents at all...   Dad - DVT (Factor V Leiden)   Mom - high chol, osteoarthritis   2 bros - one  due to suicide 8/15/2006 (age 37), depression, EtOH   1 sis -   [employer]   Work: Pharmacist, [employer]   s/p club foot release, T & A, plantar fascial release,  x 2   No smoke   No EtOH: Per patient, occasional EtOH only... (see above ? EtOHism) Followed through [facility] ([phone])   BCC on chest and both arms (f/u 10/18/13 at [facility] Dr. Crystal Lopez) Merena IUD placed in Dec, 2012   Followed [facility] Dr[facility] January 10, 2018 for physical exam with , TG 76, HDL 77, and .    Recent admit for intoxiciation 09?... now treated through [facility] for EtOH dependence.    Past Surgical History:   Procedure Laterality Date     AZ CAPSULOTOMY MIDFOOT,EXTENSIVE      Description: Midfoot Capsulotomy And Posterior Release W/ Tendon Lengthen;  Recorded: 2009;     AZ KNEE SCOPE,DIAGNOSTIC      Description: Arthroscopy Knee;  Recorded: 2009;     AZ RAD EXCIS PLANTAR FASCIA      Description: Radical Plantar Fasciectomy;  Recorded: 2009;     TONSILLECTOMY AND ADENOIDECTOMY          Family History   Problem Relation Age of Onset     Hyperlipidemia Mother      Deep vein thrombosis Father         Factor V Leiden mutation     Factor V Leiden deficiency Father      Graves' disease Sister      Stroke Maternal Grandmother         Past Medical History:   Diagnosis Date     Asthma     exercise induced     Substance abuse (H)     h/o EtOHism        Social History     Tobacco Use     Smoking status: Never Smoker     Smokeless tobacco: Never Used   Substance Use " Topics     Alcohol use: No     Drug use: No        Current Outpatient Medications   Medication Sig Dispense Refill     albuterol (PROAIR HFA;PROVENTIL HFA;VENTOLIN HFA) 90 mcg/actuation inhaler Inhale 2 puffs every 4 (four) hours as needed for wheezing. 18 g 5     aspirin 81 mg chewable tablet Chew 81 mg daily.       calcium carbonate-vit D3-min (CALCIUM-VITAMIN D) 600 mg calcium- 400 unit Tab Take 1 tablet by mouth 2 (two) times a day.       diphenhydrAMINE (BENADRYL) 50 MG capsule Take 50 mg by mouth at bedtime. PT TAKING 100 MG       fluticasone-salmeterol (ADVAIR) 250-50 mcg/dose DISKUS Inhale 1 puff 2 (two) times a day. 180 puff 3     MULTIVIT &MINERALS/FERROUS FUM (MULTI VITAMIN ORAL) Take 1 tablet by mouth daily.       No current facility-administered medications for this visit.           Objective:    Vitals:    01/24/19 1639   BP: 120/66   Pulse: 75   SpO2: 99%   Weight: 111 lb (50.3 kg)      Body mass index is 20.3 kg/m .    Alert.  No apparent distress.  HEENT exam appears normal.  Neck supple.  Chest clear.  Cardiac exam regular.  Extremities warm and dry.      This note has been dictated using voice recognition software and as a result may contain minor grammatical errors and unintended word substitutions.

## 2021-06-25 NOTE — TELEPHONE ENCOUNTER
RN cannot approve Refill Request    RN can NOT refill this medication Protocol failed and NO refill given. Last office visit: 1/24/2019 Serafin Cobb MD Last Physical: 6/4/2019 Last MTM visit: Visit date not found Last visit same specialty: 1/24/2019 Serafin Cobb MD.  Next visit within 3 mo: Visit date not found  Next physical within 3 mo: Visit date not found      Mini Guzman, [facility] Triage/Med Refill 6/11/2021    Requested Prescriptions   Pending Prescriptions Disp Refills     ADVAIR DISKUS 250-50 mcg/dose DISKUS [Pharmacy Med Name: ADVAIR DISKUS 250/50MCG (YELLOW) 60] 180 each 0     Sig: INHALE 1 PUFF BY MOUTH TWICE DAILY       There is no refill protocol information for this order

## 2021-06-26 NOTE — TELEPHONE ENCOUNTER
Result appears normal.    PEth 16:0/18:1 (POPEth)          <10       ng/mL   INTERPRETIVE INFORMATION:Phosphatidylethanol (PEth), Whole Blood   Phosphatidylethanol (PEth) homologues Result Interpretation     PEth 16:0/18:1 (POPEth)                                 Less than 10 ng/mL............Not detected

## 2021-06-26 NOTE — TELEPHONE ENCOUNTER
Reason for Call:  Request for results:    Name of test or procedure: PEth test results    Date of test of procedure: 06.01.2021    Location of the test or procedure: [facility]    OK to leave the result message on voice mail or with a family member? Yes    Phone number Patient can be reached at:   Cell number on file:    Telephone Information:   Mobile [phone]       Additional comments: anytime    Call taken on 6/22/2021 at 9:49 AM by Ambreen Watts

## 2021-07-13 ENCOUNTER — RECORDS - HEALTHEAST (OUTPATIENT)
Dept: ADMINISTRATIVE | Facility: CLINIC | Age: 51
End: 2021-07-13

## 2021-07-21 ENCOUNTER — RECORDS - HEALTHEAST (OUTPATIENT)
Dept: ADMINISTRATIVE | Facility: CLINIC | Age: 51
End: 2021-07-21

## 2021-09-05 ENCOUNTER — HEALTH MAINTENANCE LETTER (OUTPATIENT)
Age: 51
End: 2021-09-05

## 2021-10-21 ENCOUNTER — TRANSFERRED RECORDS (OUTPATIENT)
Dept: HEALTH INFORMATION MANAGEMENT | Facility: CLINIC | Age: 51
End: 2021-10-21
Payer: COMMERCIAL

## 2022-02-09 ENCOUNTER — HOSPITAL ENCOUNTER (OUTPATIENT)
Dept: MAMMOGRAPHY | Facility: CLINIC | Age: 52
Discharge: HOME | End: 2022-02-09
Attending: OBSTETRICS & GYNECOLOGY | Admitting: OBSTETRICS & GYNECOLOGY
Payer: COMMERCIAL

## 2022-02-09 DIAGNOSIS — Z12.31 VISIT FOR SCREENING MAMMOGRAM: ICD-10-CM

## 2022-02-09 PROCEDURE — 77067 SCR MAMMO BI INCL CAD: CPT

## 2022-04-21 ENCOUNTER — TRANSFERRED RECORDS (OUTPATIENT)
Dept: HEALTH INFORMATION MANAGEMENT | Facility: CLINIC | Age: 52
End: 2022-04-21
Payer: COMMERCIAL

## 2022-06-07 ENCOUNTER — TRANSFERRED RECORDS (OUTPATIENT)
Dept: HEALTH INFORMATION MANAGEMENT | Facility: CLINIC | Age: 52
End: 2022-06-07
Payer: COMMERCIAL

## 2022-06-08 ENCOUNTER — OFFICE VISIT (OUTPATIENT)
Dept: FAMILY MEDICINE | Facility: CLINIC | Age: 52
End: 2022-06-08
Payer: COMMERCIAL

## 2022-06-08 VITALS
WEIGHT: 125 LBS | SYSTOLIC BLOOD PRESSURE: 100 MMHG | OXYGEN SATURATION: 98 % | BODY MASS INDEX: 22.86 KG/M2 | HEART RATE: 87 BPM | DIASTOLIC BLOOD PRESSURE: 60 MMHG

## 2022-06-08 DIAGNOSIS — F10.21 ALCOHOL DEPENDENCE, IN REMISSION (H): ICD-10-CM

## 2022-06-08 DIAGNOSIS — Z11.4 SCREENING FOR HIV (HUMAN IMMUNODEFICIENCY VIRUS): ICD-10-CM

## 2022-06-08 DIAGNOSIS — J45.30 MILD PERSISTENT ASTHMA WITHOUT COMPLICATION: Primary | ICD-10-CM

## 2022-06-08 DIAGNOSIS — E78.00 HYPERCHOLESTEROLEMIA: ICD-10-CM

## 2022-06-08 DIAGNOSIS — D12.6 TUBULAR ADENOMA OF COLON: ICD-10-CM

## 2022-06-08 DIAGNOSIS — Z23 HIGH PRIORITY FOR 2019-NCOV VACCINE: ICD-10-CM

## 2022-06-08 DIAGNOSIS — Z23 ENCOUNTER FOR IMMUNIZATION: ICD-10-CM

## 2022-06-08 DIAGNOSIS — F51.5 NIGHTMARES: ICD-10-CM

## 2022-06-08 DIAGNOSIS — F41.9 ANXIETY: ICD-10-CM

## 2022-06-08 DIAGNOSIS — Z11.59 NEED FOR HEPATITIS C SCREENING TEST: ICD-10-CM

## 2022-06-08 DIAGNOSIS — K57.30 DIVERTICULOSIS OF LARGE INTESTINE WITHOUT HEMORRHAGE: ICD-10-CM

## 2022-06-08 DIAGNOSIS — D72.819 LEUKOPENIA, UNSPECIFIED TYPE: ICD-10-CM

## 2022-06-08 DIAGNOSIS — F33.42 RECURRENT MAJOR DEPRESSIVE DISORDER, IN FULL REMISSION (H): ICD-10-CM

## 2022-06-08 LAB
ALBUMIN SERPL-MCNC: 3.9 G/DL (ref 3.5–5)
ALP SERPL-CCNC: 120 U/L (ref 45–120)
ALT SERPL W P-5'-P-CCNC: 20 U/L (ref 0–45)
ANION GAP SERPL CALCULATED.3IONS-SCNC: 8 MMOL/L (ref 5–18)
AST SERPL W P-5'-P-CCNC: 25 U/L (ref 0–40)
BASOPHILS # BLD AUTO: 0.1 10E3/UL (ref 0–0.2)
BASOPHILS NFR BLD AUTO: 1 %
BILIRUB SERPL-MCNC: 0.7 MG/DL (ref 0–1)
BUN SERPL-MCNC: 11 MG/DL (ref 8–22)
CALCIUM SERPL-MCNC: 9.1 MG/DL (ref 8.5–10.5)
CHLORIDE BLD-SCNC: 103 MMOL/L (ref 98–107)
CHOLEST SERPL-MCNC: 283 MG/DL
CO2 SERPL-SCNC: 28 MMOL/L (ref 22–31)
CREAT SERPL-MCNC: 0.93 MG/DL (ref 0.6–1.1)
EOSINOPHIL # BLD AUTO: 0.4 10E3/UL (ref 0–0.7)
EOSINOPHIL NFR BLD AUTO: 7 %
ERYTHROCYTE [DISTWIDTH] IN BLOOD BY AUTOMATED COUNT: 14.1 % (ref 10–15)
FASTING STATUS PATIENT QL REPORTED: YES
GFR SERPL CREATININE-BSD FRML MDRD: 73 ML/MIN/1.73M2
GLUCOSE BLD-MCNC: 82 MG/DL (ref 70–125)
HCT VFR BLD AUTO: 38.4 % (ref 35–47)
HCV AB SERPL QL IA: NONREACTIVE
HDLC SERPL-MCNC: 70 MG/DL
HGB BLD-MCNC: 12.6 G/DL (ref 11.7–15.7)
HIV 1+2 AB+HIV1 P24 AG SERPL QL IA: NEGATIVE
IMM GRANULOCYTES # BLD: 0 10E3/UL
IMM GRANULOCYTES NFR BLD: 0 %
LDLC SERPL CALC-MCNC: 200 MG/DL
LYMPHOCYTES # BLD AUTO: 2 10E3/UL (ref 0.8–5.3)
LYMPHOCYTES NFR BLD AUTO: 39 %
MCH RBC QN AUTO: 29.5 PG (ref 26.5–33)
MCHC RBC AUTO-ENTMCNC: 32.8 G/DL (ref 31.5–36.5)
MCV RBC AUTO: 90 FL (ref 78–100)
MONOCYTES # BLD AUTO: 0.6 10E3/UL (ref 0–1.3)
MONOCYTES NFR BLD AUTO: 11 %
NEUTROPHILS # BLD AUTO: 2.1 10E3/UL (ref 1.6–8.3)
NEUTROPHILS NFR BLD AUTO: 42 %
PLATELET # BLD AUTO: 293 10E3/UL (ref 150–450)
POTASSIUM BLD-SCNC: 4 MMOL/L (ref 3.5–5)
PROT SERPL-MCNC: 6.4 G/DL (ref 6–8)
RBC # BLD AUTO: 4.27 10E6/UL (ref 3.8–5.2)
SODIUM SERPL-SCNC: 139 MMOL/L (ref 136–145)
TRIGL SERPL-MCNC: 63 MG/DL
WBC # BLD AUTO: 5.1 10E3/UL (ref 4–11)

## 2022-06-08 PROCEDURE — 80061 LIPID PANEL: CPT | Performed by: FAMILY MEDICINE

## 2022-06-08 PROCEDURE — 80053 COMPREHEN METABOLIC PANEL: CPT | Performed by: FAMILY MEDICINE

## 2022-06-08 PROCEDURE — 0054A COVID-19,PF,PFIZER (12+ YRS): CPT | Performed by: FAMILY MEDICINE

## 2022-06-08 PROCEDURE — 91305 COVID-19,PF,PFIZER (12+ YRS): CPT | Performed by: FAMILY MEDICINE

## 2022-06-08 PROCEDURE — 99214 OFFICE O/P EST MOD 30 MIN: CPT | Mod: 25 | Performed by: FAMILY MEDICINE

## 2022-06-08 PROCEDURE — 36415 COLL VENOUS BLD VENIPUNCTURE: CPT | Performed by: FAMILY MEDICINE

## 2022-06-08 PROCEDURE — 87389 HIV-1 AG W/HIV-1&-2 AB AG IA: CPT | Performed by: FAMILY MEDICINE

## 2022-06-08 PROCEDURE — 90750 HZV VACC RECOMBINANT IM: CPT | Performed by: FAMILY MEDICINE

## 2022-06-08 PROCEDURE — 86803 HEPATITIS C AB TEST: CPT | Performed by: FAMILY MEDICINE

## 2022-06-08 PROCEDURE — 85025 COMPLETE CBC W/AUTO DIFF WBC: CPT | Performed by: FAMILY MEDICINE

## 2022-06-08 PROCEDURE — 90471 IMMUNIZATION ADMIN: CPT | Performed by: FAMILY MEDICINE

## 2022-06-08 RX ORDER — ALBUTEROL SULFATE 90 UG/1
2 AEROSOL, METERED RESPIRATORY (INHALATION) EVERY 4 HOURS PRN
Qty: 18 G | Refills: 5 | Status: SHIPPED | OUTPATIENT
Start: 2022-06-08

## 2022-06-08 RX ORDER — FLUTICASONE PROPIONATE AND SALMETEROL 250; 50 UG/1; UG/1
POWDER RESPIRATORY (INHALATION)
Qty: 180 EACH | Refills: 3 | Status: SHIPPED | OUTPATIENT
Start: 2022-06-08 | End: 2023-06-09

## 2022-06-08 ASSESSMENT — ANXIETY QUESTIONNAIRES
2. NOT BEING ABLE TO STOP OR CONTROL WORRYING: NOT AT ALL
3. WORRYING TOO MUCH ABOUT DIFFERENT THINGS: NOT AT ALL
GAD7 TOTAL SCORE: 0
GAD7 TOTAL SCORE: 0
1. FEELING NERVOUS, ANXIOUS, OR ON EDGE: NOT AT ALL
4. TROUBLE RELAXING: NOT AT ALL
7. FEELING AFRAID AS IF SOMETHING AWFUL MIGHT HAPPEN: NOT AT ALL
7. FEELING AFRAID AS IF SOMETHING AWFUL MIGHT HAPPEN: NOT AT ALL
8. IF YOU CHECKED OFF ANY PROBLEMS, HOW DIFFICULT HAVE THESE MADE IT FOR YOU TO DO YOUR WORK, TAKE CARE OF THINGS AT HOME, OR GET ALONG WITH OTHER PEOPLE?: NOT DIFFICULT AT ALL
5. BEING SO RESTLESS THAT IT IS HARD TO SIT STILL: NOT AT ALL
GAD7 TOTAL SCORE: 0
6. BECOMING EASILY ANNOYED OR IRRITABLE: NOT AT ALL

## 2022-06-08 ASSESSMENT — PATIENT HEALTH QUESTIONNAIRE - PHQ9
SUM OF ALL RESPONSES TO PHQ QUESTIONS 1-9: 0
SUM OF ALL RESPONSES TO PHQ QUESTIONS 1-9: 0
10. IF YOU CHECKED OFF ANY PROBLEMS, HOW DIFFICULT HAVE THESE PROBLEMS MADE IT FOR YOU TO DO YOUR WORK, TAKE CARE OF THINGS AT HOME, OR GET ALONG WITH OTHER PEOPLE: NOT DIFFICULT AT ALL

## 2022-06-08 ASSESSMENT — PAIN SCALES - GENERAL: PAINLEVEL: NO PAIN (0)

## 2022-06-08 NOTE — PROGRESS NOTES
Assessment/Plan:    Mild persistent asthma without complication  Mild persistent asthma without complication.  Has not been using Advair.  Will resume Advair 250/50 use 1 puff twice daily over next 3 months minimum to determine if improvement in cough or wheeze with activity.  Albuterol MDI refilled as well.  Reassess no later than 1 year in office.  - albuterol (PROAIR HFA/PROVENTIL HFA/VENTOLIN HFA) 108 (90 Base) MCG/ACT inhaler  Dispense: 18 g; Refill: 5  - fluticasone-salmeterol (ADVAIR DISKUS) 250-50 MCG/ACT inhaler  Dispense: 180 each; Refill: 3    Alcohol dependence, in remission (H)  Alcohol dependence remains in remission since April 29, 2020.  Congratulated patient on ongoing sobriety.    Hypercholesterolemia  Hypercholesterolemia.  Check lipid cascade today while fasting.  - Lipid panel reflex to direct LDL Fasting    Leukopenia, unspecified type  Leukopenia historically mild at time of alcohol abuse.  Repeat CBC to ensure resolved with ongoing sobriety.  - Comprehensive metabolic panel  - CBC with Platelets & Differential    Recurrent major depressive disorder, in full remission (H)  Utilizes fluoxetine 20 mg daily.  Follows with Martha Trejo at [facility].  Utilizing fluoxetine 20 mg daily.  PHQ-9 questionnaire 0 out of 27.  - FLUoxetine (PROZAC) 20 MG capsule  Dispense: 30 capsule; Refill: 0    Anxiety  JUJU-7 questionnaire 0 out of 21.  Continues fluoxetine 20 mg daily.    Nightmares  Benefits of prazosin 2 mg at bedtime.    Tubular adenoma of colon  Tubular adenoma of colon with colonoscopy January 14, 2021 with recommendation for 5-year repeat due to tubular adenoma findings.    Diverticulosis of large intestine without hemorrhage  Asymptomatic.  Sigmoid colon.    High priority for 2019-nCoV vaccine  COVID-19 Pfizer second booster provided.  - COVID-19,PF,PFIZER (12+ Yrs GRAY LABEL)    Encounter for immunization  Shingrix immunization provided with booster anticipated in 2 to 6 months.  - ZOSTER  "VACCINE RECOMBINANT ADJUVANTED (SHINGRIX)    Need for hepatitis C screening test  Routine hepatitis C screen completed.  - Hepatitis C Screen Reflex to HCV RNA Quant and Genotype    Screening for HIV (human immunodeficiency virus)  Routine HIV screen completed.  - HIV Antigen Antibody Combo          Subjective:    Nova Sanches is seen today for follow-up assessment.  Mild persistent asthma.  Not using Advair currently.  Had been on 250/50 using 1 puff twice daily previously.  Albuterol MDI also available on as-needed basis but needs refill.  No longer running for activity due to right hip issues with torn labrum historically status post prior right hip arthroscopy procedure.  Walks on treadmill on an incline.  Will have cough and wheeze at times with exercise.  Remains in remission from alcohol dependence since 2020 following completion of 4-week residential program in [place].  Still sees trauma therapist.  Working with Martha Trejo with [facility] and continues fluoxetine 20 mg daily for depression and anxiety symptom management as well as prazosin 2 mg at bedtime for nightmare management.  Prior colonoscopy 2021 with abnormal Cologuard previously.  Findings of tubular adenoma.  Told to repeat at 5-year interval.  Has had mild decrease in white count 3.9 at time of alcohol abuse concerns with blood alcohol level 370.  Also sodium of 150 at that time.  Her comprehensive review of systems as above otherwise all negative.  States had Pap smear yearly basis through [facility] on 2022.     \"Cm\" x 92   2 daughters - Chata and Deann   Doesn't talk to parents at all...   Dad - DVT (Factor V Leiden)   Mom - high chol, osteoarthritis   2 bros - one  due to suicide 8/15/2006 (age 37), depression, EtOH   1 [facility]   [employer]   Work: Pharmacist, [email or web address]   s/p club foot release, T & A, plantar fascial release,  x 2   No smoke   No EtOH: Per " patient, occasional EtOH only... (see above ? EtOHism) Followed through [facility] ([phone])   BCC on chest and both arms (f/u 10/18/13 at [facility] Dr. Crystal Lopez) Merena IUD placed in Dec, 2012   Followed [facility] Dr[facility] January 10, 2018 for physical exam with , TG 76, HDL 77, and .    Past Surgical History:   Procedure Laterality Date     HC KNEE SCOPE, DIAGNOSTIC      Description: Arthroscopy Knee;  Recorded: 04/01/2009;     TONSILLECTOMY & ADENOIDECTOMY       ZZC CAPSULOTOMY MIDFOOT,EXTENSIVE      Description: Midfoot Capsulotomy And Posterior Release W/ Tendon Lengthen;  Recorded: 04/01/2009;     ZZC RAD EXCIS PLANTAR FASCIA      Description: Radical Plantar Fasciectomy;  Recorded: 04/01/2009;        Family History   Problem Relation Age of Onset     Hyperlipidemia Mother      Deep Vein Thrombosis Father         Factor V Leiden mutation     Factor V Leiden deficiency Father      Graves' disease Sister      Cerebrovascular Disease Maternal Grandmother         Past Medical History:   Diagnosis Date     Asthma     exercise induced     Substance abuse (H)     h/o EtOHism        Social History     Tobacco Use     Smoking status: Never Smoker     Smokeless tobacco: Never Used   Substance Use Topics     Alcohol use: No     Drug use: No        Current Outpatient Medications   Medication Sig Dispense Refill     albuterol (PROAIR HFA/PROVENTIL HFA/VENTOLIN HFA) 108 (90 Base) MCG/ACT inhaler Inhale 2 puffs into the lungs every 4 hours as needed for wheezing 18 g 5     aspirin 81 mg chewable tablet [ASPIRIN 81 MG CHEWABLE TABLET] Chew 81 mg daily.       calcium carbonate-vit D3-min (CALCIUM-VITAMIN D) 600 mg calcium- 400 unit Tab [CALCIUM CARBONATE-VIT D3-MIN (CALCIUM-VITAMIN D) 600 MG CALCIUM- 400 UNIT TAB] Take 1 tablet by mouth 2 (two) times a day.       diphenhydrAMINE (BENADRYL) 50 MG capsule [DIPHENHYDRAMINE  (BENADRYL) 50 MG CAPSULE] Take 50 mg by mouth at bedtime. PT TAKING 100 MG       FLUoxetine (PROZAC) 20 MG capsule Take 1 capsule (20 mg) by mouth daily 30 capsule 0     melatonin 5 mg cap [MELATONIN 5 MG CAP] Take by mouth.       MULTIVIT &MINERALS/FERROUS FUM (MULTI VITAMIN ORAL) [MULTIVIT &MINERALS/FERROUS FUM (MULTI VITAMIN ORAL)] Take 1 tablet by mouth daily.       prazosin (MINIPRESS) 2 MG capsule [PRAZOSIN (MINIPRESS) 2 MG CAPSULE]        ADVAIR DISKUS 250-50 mcg/dose DISKUS [ADVAIR DISKUS 250-50 MCG/DOSE DISKUS] INHALE 1 PUFF BY MOUTH TWICE DAILY (Patient not taking: Reported on 6/8/2022) 180 each 1          Objective:    Vitals:    06/08/22 0724   BP: 100/60   Pulse: 87   SpO2: 98%   Weight: 56.7 kg (125 lb)      Body mass index is 22.86 kg/m .    Alert.  No apparent distress.  Chest clear to auscultation.  Cardiac exam regular.  Extremities warm and dry.      This note has been dictated using voice recognition software and as a result may contain minor grammatical errors and unintended word substitutions.         Answers for HPI/ROS submitted by the patient on 6/8/2022  If you checked off any problems, how difficult have these problems made it for you to do your work, take care of things at home, or get along with other people?: Not difficult at all  PHQ9 TOTAL SCORE: 0  JUJU 7 TOTAL SCORE: 0  Do you have a cough?: No  Are you experiencing any wheezing in your chest?: No  Do you have any shortness of breath?: Yes  Use of rescue inhaler:: never  Taking Asthma medication as prescribed:: 0  Asthma triggers:: same as previous visit, exercise or sports  Have you had any Emergency Room visits, Urgent Care visits, or Hospital Admissions since your last office visit?: No  How many servings of fruits and vegetables do you eat daily?: 4 or more  On average, how many sweetened beverages do you drink each day (Examples: soda, juice, sweet tea, etc.  Do NOT count diet or artificially sweetened beverages)?: 0  How many  minutes a day do you exercise enough to make your heart beat faster?: 30 to 60  How many days a week do you exercise enough to make your heart beat faster?: 5  How many days per week do you miss taking your medication?: 0

## 2022-10-19 ENCOUNTER — TELEPHONE (OUTPATIENT)
Dept: FAMILY MEDICINE | Facility: CLINIC | Age: 52
End: 2022-10-19

## 2022-10-19 NOTE — TELEPHONE ENCOUNTER
Pt is calling wondering if she should make a lab appt for a cholesterol test? If so please place order.     Call pt and schedule lab apt if she needs to come in

## 2022-10-19 NOTE — TELEPHONE ENCOUNTER
Pt contacted and reviewed dr. Del Angel recommendation to recheck in 6-12 months.  Pt is opting to wait till this summer.

## 2022-10-21 ENCOUNTER — TRANSFERRED RECORDS (OUTPATIENT)
Dept: HEALTH INFORMATION MANAGEMENT | Facility: CLINIC | Age: 52
End: 2022-10-21

## 2022-10-26 DIAGNOSIS — Z23 ENCOUNTER FOR IMMUNIZATION: Primary | ICD-10-CM

## 2022-10-29 ENCOUNTER — MYC MEDICAL ADVICE (OUTPATIENT)
Dept: FAMILY MEDICINE | Facility: CLINIC | Age: 52
End: 2022-10-29

## 2022-10-29 ENCOUNTER — HEALTH MAINTENANCE LETTER (OUTPATIENT)
Age: 52
End: 2022-10-29

## 2022-11-01 ENCOUNTER — IMMUNIZATION (OUTPATIENT)
Dept: FAMILY MEDICINE | Facility: CLINIC | Age: 52
End: 2022-11-01
Payer: COMMERCIAL

## 2022-11-01 DIAGNOSIS — Z23 ENCOUNTER FOR IMMUNIZATION: ICD-10-CM

## 2022-11-01 DIAGNOSIS — Z23 NEED FOR VACCINATION: Primary | ICD-10-CM

## 2022-11-01 PROCEDURE — 90750 HZV VACC RECOMBINANT IM: CPT

## 2022-11-01 PROCEDURE — 0124A COVID-19,PF,PFIZER BOOSTER BIVALENT: CPT

## 2022-11-01 PROCEDURE — 90471 IMMUNIZATION ADMIN: CPT

## 2022-11-01 PROCEDURE — 90677 PCV20 VACCINE IM: CPT

## 2022-11-01 PROCEDURE — 90472 IMMUNIZATION ADMIN EACH ADD: CPT

## 2022-11-01 PROCEDURE — 91312 COVID-19,PF,PFIZER BOOSTER BIVALENT: CPT

## 2022-11-15 ENCOUNTER — LAB REQUISITION (OUTPATIENT)
Dept: LAB | Facility: CLINIC | Age: 52
End: 2022-11-15

## 2022-11-15 DIAGNOSIS — Z12.4 ENCOUNTER FOR SCREENING FOR MALIGNANT NEOPLASM OF CERVIX: ICD-10-CM

## 2022-11-15 PROCEDURE — 87624 HPV HI-RISK TYP POOLED RSLT: CPT | Performed by: OBSTETRICS & GYNECOLOGY

## 2022-11-15 PROCEDURE — G0145 SCR C/V CYTO,THINLAYER,RESCR: HCPCS | Performed by: OBSTETRICS & GYNECOLOGY

## 2022-11-17 LAB
BKR LAB AP GYN ADEQUACY: NORMAL
BKR LAB AP GYN INTERPRETATION: NORMAL
BKR LAB AP HPV REFLEX: NORMAL
BKR LAB AP LMP: NORMAL
BKR LAB AP PREVIOUS ABNL DX: NORMAL
BKR LAB AP PREVIOUS ABNORMAL: NORMAL
PATH REPORT.COMMENTS IMP SPEC: NORMAL
PATH REPORT.COMMENTS IMP SPEC: NORMAL
PATH REPORT.RELEVANT HX SPEC: NORMAL

## 2022-11-21 LAB
HUMAN PAPILLOMA VIRUS 16 DNA: NEGATIVE
HUMAN PAPILLOMA VIRUS 18 DNA: NEGATIVE
HUMAN PAPILLOMA VIRUS FINAL DIAGNOSIS: NORMAL
HUMAN PAPILLOMA VIRUS OTHER HR: NEGATIVE

## 2023-04-14 ENCOUNTER — TRANSFERRED RECORDS (OUTPATIENT)
Dept: HEALTH INFORMATION MANAGEMENT | Facility: CLINIC | Age: 53
End: 2023-04-14
Payer: COMMERCIAL

## 2023-05-26 ENCOUNTER — TRANSFERRED RECORDS (OUTPATIENT)
Dept: HEALTH INFORMATION MANAGEMENT | Facility: CLINIC | Age: 53
End: 2023-05-26
Payer: COMMERCIAL

## 2023-11-28 ENCOUNTER — TRANSFERRED RECORDS (OUTPATIENT)
Dept: HEALTH INFORMATION MANAGEMENT | Facility: CLINIC | Age: 53
End: 2023-11-28
Payer: COMMERCIAL

## 2024-01-16 ENCOUNTER — PATIENT OUTREACH (OUTPATIENT)
Dept: CARE COORDINATION | Facility: CLINIC | Age: 54
End: 2024-01-16
Payer: COMMERCIAL

## 2024-01-20 ENCOUNTER — HEALTH MAINTENANCE LETTER (OUTPATIENT)
Age: 54
End: 2024-01-20

## 2024-02-06 ENCOUNTER — LAB REQUISITION (OUTPATIENT)
Dept: LAB | Facility: CLINIC | Age: 54
End: 2024-02-06

## 2024-02-06 DIAGNOSIS — Z13.220 ENCOUNTER FOR SCREENING FOR LIPOID DISORDERS: ICD-10-CM

## 2024-02-06 DIAGNOSIS — Z13.1 ENCOUNTER FOR SCREENING FOR DIABETES MELLITUS: ICD-10-CM

## 2024-02-06 DIAGNOSIS — Z13.29 ENCOUNTER FOR SCREENING FOR OTHER SUSPECTED ENDOCRINE DISORDER: ICD-10-CM

## 2024-02-06 LAB
CHOLEST SERPL-MCNC: 270 MG/DL
FASTING STATUS PATIENT QL REPORTED: YES
FASTING STATUS PATIENT QL REPORTED: YES
GLUCOSE SERPL-MCNC: 91 MG/DL (ref 70–99)
HBA1C MFR BLD: 5.7 %
HDLC SERPL-MCNC: 81 MG/DL
LDLC SERPL CALC-MCNC: 175 MG/DL
NONHDLC SERPL-MCNC: 189 MG/DL
TRIGL SERPL-MCNC: 71 MG/DL
TSH SERPL DL<=0.005 MIU/L-ACNC: 2.81 UIU/ML (ref 0.3–4.2)

## 2024-02-06 PROCEDURE — 84443 ASSAY THYROID STIM HORMONE: CPT | Performed by: OBSTETRICS & GYNECOLOGY

## 2024-02-06 PROCEDURE — 80061 LIPID PANEL: CPT | Performed by: OBSTETRICS & GYNECOLOGY

## 2024-02-06 PROCEDURE — 83036 HEMOGLOBIN GLYCOSYLATED A1C: CPT | Performed by: OBSTETRICS & GYNECOLOGY

## 2024-02-06 PROCEDURE — 82947 ASSAY GLUCOSE BLOOD QUANT: CPT | Performed by: OBSTETRICS & GYNECOLOGY

## 2024-02-19 ENCOUNTER — HOSPITAL ENCOUNTER (OUTPATIENT)
Dept: MAMMOGRAPHY | Facility: CLINIC | Age: 54
Discharge: HOME | End: 2024-02-19
Attending: OBSTETRICS & GYNECOLOGY | Admitting: OBSTETRICS & GYNECOLOGY
Payer: COMMERCIAL

## 2024-02-19 ENCOUNTER — ANCILLARY PROCEDURE (OUTPATIENT)
Dept: BONE DENSITY | Facility: CLINIC | Age: 54
End: 2024-02-19
Attending: OBSTETRICS & GYNECOLOGY
Payer: COMMERCIAL

## 2024-02-19 DIAGNOSIS — Z13.820 ENCOUNTER FOR SCREENING FOR OSTEOPOROSIS: ICD-10-CM

## 2024-02-19 DIAGNOSIS — Z12.31 ENCOUNTER FOR SCREENING MAMMOGRAM FOR MALIGNANT NEOPLASM OF BREAST: ICD-10-CM

## 2024-02-19 PROCEDURE — 77080 DXA BONE DENSITY AXIAL: CPT | Mod: TC

## 2024-02-19 PROCEDURE — 77067 SCR MAMMO BI INCL CAD: CPT

## 2024-04-08 SDOH — HEALTH STABILITY: PHYSICAL HEALTH: ON AVERAGE, HOW MANY DAYS PER WEEK DO YOU ENGAGE IN MODERATE TO STRENUOUS EXERCISE (LIKE A BRISK WALK)?: 5 DAYS

## 2024-04-08 SDOH — HEALTH STABILITY: PHYSICAL HEALTH: ON AVERAGE, HOW MANY MINUTES DO YOU ENGAGE IN EXERCISE AT THIS LEVEL?: 60 MIN

## 2024-04-08 ASSESSMENT — ASTHMA QUESTIONNAIRES
ACT_TOTALSCORE: 24
QUESTION_2 LAST FOUR WEEKS HOW OFTEN HAVE YOU HAD SHORTNESS OF BREATH: NOT AT ALL
QUESTION_1 LAST FOUR WEEKS HOW MUCH OF THE TIME DID YOUR ASTHMA KEEP YOU FROM GETTING AS MUCH DONE AT WORK, SCHOOL OR AT HOME: NONE OF THE TIME
QUESTION_3 LAST FOUR WEEKS HOW OFTEN DID YOUR ASTHMA SYMPTOMS (WHEEZING, COUGHING, SHORTNESS OF BREATH, CHEST TIGHTNESS OR PAIN) WAKE YOU UP AT NIGHT OR EARLIER THAN USUAL IN THE MORNING: NOT AT ALL
QUESTION_5 LAST FOUR WEEKS HOW WOULD YOU RATE YOUR ASTHMA CONTROL: WELL CONTROLLED
ACT_TOTALSCORE: 24
QUESTION_4 LAST FOUR WEEKS HOW OFTEN HAVE YOU USED YOUR RESCUE INHALER OR NEBULIZER MEDICATION (SUCH AS ALBUTEROL): NOT AT ALL

## 2024-04-08 ASSESSMENT — SOCIAL DETERMINANTS OF HEALTH (SDOH): HOW OFTEN DO YOU GET TOGETHER WITH FRIENDS OR RELATIVES?: TWICE A WEEK

## 2024-04-08 ASSESSMENT — PATIENT HEALTH QUESTIONNAIRE - PHQ9
SUM OF ALL RESPONSES TO PHQ QUESTIONS 1-9: 0
10. IF YOU CHECKED OFF ANY PROBLEMS, HOW DIFFICULT HAVE THESE PROBLEMS MADE IT FOR YOU TO DO YOUR WORK, TAKE CARE OF THINGS AT HOME, OR GET ALONG WITH OTHER PEOPLE: NOT DIFFICULT AT ALL
SUM OF ALL RESPONSES TO PHQ QUESTIONS 1-9: 0

## 2024-04-09 ENCOUNTER — OFFICE VISIT (OUTPATIENT)
Dept: FAMILY MEDICINE | Facility: CLINIC | Age: 54
End: 2024-04-09
Payer: COMMERCIAL

## 2024-04-09 VITALS
SYSTOLIC BLOOD PRESSURE: 106 MMHG | HEART RATE: 70 BPM | WEIGHT: 129 LBS | OXYGEN SATURATION: 98 % | TEMPERATURE: 96.9 F | RESPIRATION RATE: 16 BRPM | DIASTOLIC BLOOD PRESSURE: 70 MMHG | BODY MASS INDEX: 24.35 KG/M2 | HEIGHT: 61 IN

## 2024-04-09 DIAGNOSIS — J45.30 MILD PERSISTENT ASTHMA WITHOUT COMPLICATION: ICD-10-CM

## 2024-04-09 DIAGNOSIS — R73.01 IMPAIRED FASTING GLUCOSE: ICD-10-CM

## 2024-04-09 DIAGNOSIS — D12.6 TUBULAR ADENOMA OF COLON: ICD-10-CM

## 2024-04-09 DIAGNOSIS — M81.0 OSTEOPOROSIS, UNSPECIFIED OSTEOPOROSIS TYPE, UNSPECIFIED PATHOLOGICAL FRACTURE PRESENCE: ICD-10-CM

## 2024-04-09 DIAGNOSIS — E78.00 HYPERCHOLESTEROLEMIA: Primary | ICD-10-CM

## 2024-04-09 LAB
ERYTHROCYTE [DISTWIDTH] IN BLOOD BY AUTOMATED COUNT: 14 % (ref 10–15)
HCT VFR BLD AUTO: 41.4 % (ref 35–47)
HGB BLD-MCNC: 13.2 G/DL (ref 11.7–15.7)
MCH RBC QN AUTO: 27.7 PG (ref 26.5–33)
MCHC RBC AUTO-ENTMCNC: 31.9 G/DL (ref 31.5–36.5)
MCV RBC AUTO: 87 FL (ref 78–100)
PLATELET # BLD AUTO: 297 10E3/UL (ref 150–450)
RBC # BLD AUTO: 4.77 10E6/UL (ref 3.8–5.2)
WBC # BLD AUTO: 6.5 10E3/UL (ref 4–11)

## 2024-04-09 PROCEDURE — 80053 COMPREHEN METABOLIC PANEL: CPT | Performed by: FAMILY MEDICINE

## 2024-04-09 PROCEDURE — 85027 COMPLETE CBC AUTOMATED: CPT | Performed by: FAMILY MEDICINE

## 2024-04-09 PROCEDURE — 82306 VITAMIN D 25 HYDROXY: CPT | Performed by: FAMILY MEDICINE

## 2024-04-09 PROCEDURE — 83970 ASSAY OF PARATHORMONE: CPT | Performed by: FAMILY MEDICINE

## 2024-04-09 PROCEDURE — 83735 ASSAY OF MAGNESIUM: CPT | Performed by: FAMILY MEDICINE

## 2024-04-09 PROCEDURE — 36415 COLL VENOUS BLD VENIPUNCTURE: CPT | Performed by: FAMILY MEDICINE

## 2024-04-09 PROCEDURE — 99214 OFFICE O/P EST MOD 30 MIN: CPT | Performed by: FAMILY MEDICINE

## 2024-04-09 ASSESSMENT — ANXIETY QUESTIONNAIRES
8. IF YOU CHECKED OFF ANY PROBLEMS, HOW DIFFICULT HAVE THESE MADE IT FOR YOU TO DO YOUR WORK, TAKE CARE OF THINGS AT HOME, OR GET ALONG WITH OTHER PEOPLE?: NOT DIFFICULT AT ALL
1. FEELING NERVOUS, ANXIOUS, OR ON EDGE: NOT AT ALL
7. FEELING AFRAID AS IF SOMETHING AWFUL MIGHT HAPPEN: NOT AT ALL
GAD7 TOTAL SCORE: 0
IF YOU CHECKED OFF ANY PROBLEMS ON THIS QUESTIONNAIRE, HOW DIFFICULT HAVE THESE PROBLEMS MADE IT FOR YOU TO DO YOUR WORK, TAKE CARE OF THINGS AT HOME, OR GET ALONG WITH OTHER PEOPLE: NOT DIFFICULT AT ALL
7. FEELING AFRAID AS IF SOMETHING AWFUL MIGHT HAPPEN: NOT AT ALL
GAD7 TOTAL SCORE: 0
2. NOT BEING ABLE TO STOP OR CONTROL WORRYING: NOT AT ALL
4. TROUBLE RELAXING: NOT AT ALL
3. WORRYING TOO MUCH ABOUT DIFFERENT THINGS: NOT AT ALL
GAD7 TOTAL SCORE: 0
5. BEING SO RESTLESS THAT IT IS HARD TO SIT STILL: NOT AT ALL
6. BECOMING EASILY ANNOYED OR IRRITABLE: NOT AT ALL

## 2024-04-09 ASSESSMENT — PAIN SCALES - GENERAL: PAINLEVEL: NO PAIN (0)

## 2024-04-09 NOTE — PROGRESS NOTES
Assessment/Plan:    Hypercholesterolemia  Hypercholesterolemia reviewed.  10-year cardiovascular risk of 1.5% reviewed.  Dietary and exercise modifications to continue.  Protective HDL cholesterol remains elevated.  Maintain current weight less than 130 pounds ideally.    Mild persistent asthma without complication  Mild persistent asthma now doing fine and not requiring controller inhaler since no longer able to run for exercise.    Impaired fasting glucose  Impaired fasting glucose with A1c of 5.7% otherwise fasting glucose had been normal at 91 during time of recent female exam.  - Comprehensive metabolic panel  - Comprehensive metabolic panel    Tubular adenoma of colon  Prior colonoscopy January 15, 2021 with tubular adenoma and recommendation for 5-year follow-up    Osteoporosis, unspecified osteoporosis type, unspecified pathological fracture presence  Secondary osteoporosis workup to be completed.  Check vitamin D, magnesium, parathyroid hormone level today.  Endocrine referral.  Patient declines bisphosphonate use currently.  Would consider Prolia injections in future.  Will utilize Caltrate 600/200 use 1 tablet twice daily plus vitamin D supplement 2000 units daily.  Ensure weightbearing activity.  Ensure adequate dietary calcium intake as well.  - Vitamin D Deficiency  - CBC with platelets  - Magnesium  - Adult Endocrinology [facility] Referral  - Comprehensive metabolic panel  - Vitamin D Deficiency  - CBC with platelets  - Magnesium  - Comprehensive metabolic panel    30 minutes total time spent on the date of encounter including patient chart review, counseling and coordination of cares, and documentation.         Subjective:    Nova Scottcullenshannan is seen today for follow-up assessment.  Questions regarding lab results from recent GYN exam February 6, 2024.  Hypercholesterolemia with total cholesterol 270, HDL 81 and .  10-year cardiovascular risk review to 1.5%.  History of mild persistent  "asthma.  No longer having issues requiring controller inhaler since no longer running for exercise.  Labs suggest prediabetes with A1c borderline at 5.7% otherwise fasting glucose 91.  No significant family history of diabetes other than patient's mother who has associated obesity concerns.  Patient has had positive Cologuard in the past did have subsequent colonoscopy January 15, 2021 with tubular adenoma of colon and told to repeat at 5-year interval.  Does describe that she is also completed recent bone density scan with question of osteoporosis but this surprises her.  Patient not utilizing current calcium and vitamin D supplementation.  Comprehensive review of systems as above otherwise all negative.    The 10-year ASCVD risk score (Lane ONOFRE, et al., 2019) is: 1.5%    Values used to calculate the score:      Age: 54 years      Sex: Female      Is Non- : No      Diabetic: No      Tobacco smoker: No      Systolic Blood Pressure: 106 mmHg      Is BP treated: No      HDL Cholesterol: 81 mg/dL      Total Cholesterol: 270 mg/dL      \"Cm\" x 92   2 daughters - Deann and Chata  Doesn't talk to parents at all...   Dad - DVT (Factor V Leiden)   Mom - high chol, osteoarthritis; type 2 DM with obesity  2 bros - one  due to suicide 8/15/2006 (age 37), depression, EtOH   1 sis -   [employer]   Work: Pharmacist, now at [employer]; prior [employer]   Surgeries:  s/p club foot release, T & A, plantar fascial release; right hip arthroscopy with torn labrum in 2019  Hospitalizations:   x 2   No smoke   No EtOH x 20: Per patient, occasional EtOH only... (see above ? EtOHism) Followed through [facility] ([phone])   BCC on chest and both arms (f/u 10/18/13 at [facility] Dr. Crystal Lopez) Merena IUD placed in Dec, 2012   Followed [facility] Dr. Angely [facility] January 10, 2018 for " physical exam with , TG 76, HDL 77, and .        Past Surgical History:   Procedure Laterality Date    HC KNEE SCOPE, DIAGNOSTIC      Description: Arthroscopy Knee;  Recorded: 04/01/2009;    TONSILLECTOMY & ADENOIDECTOMY      Z CAPSULOTOMY MIDFOOT,EXTENSIVE      Description: Midfoot Capsulotomy And Posterior Release W/ Tendon Lengthen;  Recorded: 04/01/2009;    ZZC RAD EXCIS PLANTAR FASCIA      Description: Radical Plantar Fasciectomy;  Recorded: 04/01/2009;        Family History   Problem Relation Age of Onset    Hyperlipidemia Mother     Deep Vein Thrombosis Father         Factor V Leiden mutation    Factor V Leiden deficiency Father     Graves' disease Sister     Cerebrovascular Disease Maternal Grandmother         Past Medical History:   Diagnosis Date    Asthma     exercise induced    Substance abuse (H)     h/o EtOHism        Social History     Tobacco Use    Smoking status: Never    Smokeless tobacco: Never   Substance Use Topics    Alcohol use: No    Drug use: No        Current Outpatient Medications   Medication Sig Dispense Refill    albuterol (PROAIR HFA/PROVENTIL HFA/VENTOLIN HFA) 108 (90 Base) MCG/ACT inhaler Inhale 2 puffs into the lungs every 4 hours as needed for wheezing 18 g 5    aspirin 81 mg chewable tablet [ASPIRIN 81 MG CHEWABLE TABLET] Chew 81 mg daily.      calcium carbonate-vit D3-min (CALCIUM-VITAMIN D) 600 mg calcium- 400 unit Tab [CALCIUM CARBONATE-VIT D3-MIN (CALCIUM-VITAMIN D) 600 MG CALCIUM- 400 UNIT TAB] Take 1 tablet by mouth 2 (two) times a day.      diphenhydrAMINE (BENADRYL) 50 MG capsule [DIPHENHYDRAMINE (BENADRYL) 50 MG CAPSULE] Take 50 mg by mouth at bedtime. PT TAKING 100 MG      FLUoxetine (PROZAC) 20 MG capsule Take 1 capsule (20 mg) by mouth daily 30 capsule 0    melatonin 5 mg cap [MELATONIN 5 MG CAP] Take by mouth.      MULTIVIT &MINERALS/FERROUS FUM (MULTI VITAMIN ORAL) [MULTIVIT &MINERALS/FERROUS FUM (MULTI VITAMIN ORAL)] Take 1 tablet by mouth daily.    "   prazosin (MINIPRESS) 2 MG capsule [PRAZOSIN (MINIPRESS) 2 MG CAPSULE]       fluticasone-salmeterol (ADVAIR DISKUS) 250-50 MCG/ACT inhaler INHALE 1 PUFF BY MOUTH TWICE DAILY 180 each 1          Objective:    Vitals:    04/09/24 1423   BP: 106/70   Pulse: 70   Resp: 16   Temp: 96.9  F (36.1  C)   SpO2: 98%   Weight: 58.5 kg (129 lb)   Height: 1.556 m (5' 1.25\")      Body mass index is 24.18 kg/m .    Alert.  No apparent distress.  Chest clear.  Cardiac exam regular.  Extremities warm and dry.        EXAM: DX HIP/PELVIS/SPINE  LOCATION: [facility]  DATE: 2/19/2024     INDICATION: BMD screening, baseline exam.  DEMOGRAPHICS: Age- 54 years. Gender- Female. Menopausal status- Postmenopausal.  COMPARISON: No prior studies available on the current scanner.  TECHNIQUE: Dual-energy x-ray absorptiometry (DXA) performed with routine technique. Trabecular bone score (TBS) analysis performed.     FINDINGS:     DXA RESULTS  -Lumbar Spine: L1-L4: BMD: 0.952 g/cm2. T-score: -1.9. Z-score: -1.0.  -RIGHT Hip Total: BMD: 0.808 g/cm2. T-score: -1.6. Z-score: -0.9.  -RIGHT Hip Femoral neck: BMD: 0.704 g/cm2. T-score: -2.4. Z-score: -1.3.  -LEFT Hip Total: BMD: 0.780 g/cm2. T-score: -1.8. Z-score: -1.1.  -LEFT Hip Femoral neck: BMD: 0.682 g/cm2. T-score: -2.6. Z-score: -1.5.     WHO T-SCORE CRITERIA  -Normal: T score at or above -1 SD  -Osteopenia: T score between -1 and -2.5 SD  -Osteoporosis: T score at or below -2.5 SD     The World Health Organization (WHO) criteria is applicable to perimenopausal females, postmenopausal females, and men aged 50 years or older.     TBS RESULTS  -Lumbar Spine L1-L4: TBS: 1.444. TBS T-score: -0.4.TBS Z-score: 1.1.     The TBS is a DXA derived measurement for fracture risk assessment, and reflects the structural condition of the bone microarchitecture. It can be used to adjust WHO Fracture Risk Assessment Tool (FRAX) probability of fracture in postmenopausal women and   older " men. The calculated probabilities of fracture have been shown to be more accurate when computed with the TBS.     INTERVAL CHANGE  -No prior studies available on the current scanner for comparison.       FRACTURE RISK  -The FRAX risk calculator is not applicable due to osteoporosis.     RECOMMENDATIONS  The patient's BMD is consistent with osteoporosis, and he/she is at increased fracture risk. If not currently being treated for low BMD, this would merit treatment according to the Bone Health and Osteoporosis Foundation.                                                                      IMPRESSION: OSTEOPOROSIS. T score meets the WHO criteria for osteoporosis at one or more measured sites. The risk of osteoporotic fracture increases approximately two-fold for each standard deviation decrease in T-score.         This note has been dictated using voice recognition software and as a result may contain minor grammatical errors and unintended word substitutions.       Answers submitted by the patient for this visit:  Patient Health Questionnaire (Submitted on 4/8/2024)  If you checked off any problems, how difficult have these problems made it for you to do your work, take care of things at home, or get along with other people?: Not difficult at all  PHQ9 TOTAL SCORE: 0

## 2024-04-10 LAB
ALBUMIN SERPL BCG-MCNC: 4.4 G/DL (ref 3.5–5.2)
ALP SERPL-CCNC: 118 U/L (ref 40–150)
ALT SERPL W P-5'-P-CCNC: 20 U/L (ref 0–50)
ANION GAP SERPL CALCULATED.3IONS-SCNC: 12 MMOL/L (ref 7–15)
AST SERPL W P-5'-P-CCNC: 28 U/L (ref 0–45)
BILIRUB SERPL-MCNC: 0.5 MG/DL
BUN SERPL-MCNC: 14.6 MG/DL (ref 6–20)
CALCIUM SERPL-MCNC: 9.2 MG/DL (ref 8.6–10)
CHLORIDE SERPL-SCNC: 100 MMOL/L (ref 98–107)
CREAT SERPL-MCNC: 0.9 MG/DL (ref 0.51–0.95)
DEPRECATED HCO3 PLAS-SCNC: 25 MMOL/L (ref 22–29)
EGFRCR SERPLBLD CKD-EPI 2021: 75 ML/MIN/1.73M2
GLUCOSE SERPL-MCNC: 68 MG/DL (ref 70–99)
MAGNESIUM SERPL-MCNC: 2 MG/DL (ref 1.7–2.3)
POTASSIUM SERPL-SCNC: 4.2 MMOL/L (ref 3.4–5.3)
PROT SERPL-MCNC: 6.7 G/DL (ref 6.4–8.3)
PTH-INTACT SERPL-MCNC: 35 PG/ML (ref 15–65)
SODIUM SERPL-SCNC: 137 MMOL/L (ref 135–145)
VIT D+METAB SERPL-MCNC: 45 NG/ML (ref 20–50)

## 2024-05-02 ENCOUNTER — TRANSFERRED RECORDS (OUTPATIENT)
Dept: HEALTH INFORMATION MANAGEMENT | Facility: CLINIC | Age: 54
End: 2024-05-02
Payer: COMMERCIAL

## 2024-05-06 ENCOUNTER — TRANSFERRED RECORDS (OUTPATIENT)
Dept: HEALTH INFORMATION MANAGEMENT | Facility: CLINIC | Age: 54
End: 2024-05-06
Payer: COMMERCIAL

## 2024-06-30 ENCOUNTER — TRANSFERRED RECORDS (OUTPATIENT)
Dept: HEALTH INFORMATION MANAGEMENT | Facility: CLINIC | Age: 54
End: 2024-06-30
Payer: COMMERCIAL

## 2024-07-16 ENCOUNTER — TRANSFERRED RECORDS (OUTPATIENT)
Dept: HEALTH INFORMATION MANAGEMENT | Facility: CLINIC | Age: 54
End: 2024-07-16
Payer: COMMERCIAL

## 2024-08-08 ENCOUNTER — TRANSFERRED RECORDS (OUTPATIENT)
Dept: HEALTH INFORMATION MANAGEMENT | Facility: CLINIC | Age: 54
End: 2024-08-08
Payer: COMMERCIAL

## 2024-08-13 ENCOUNTER — TRANSFERRED RECORDS (OUTPATIENT)
Dept: HEALTH INFORMATION MANAGEMENT | Facility: CLINIC | Age: 54
End: 2024-08-13
Payer: COMMERCIAL

## 2024-09-23 ENCOUNTER — VIRTUAL VISIT (OUTPATIENT)
Dept: ENDOCRINOLOGY | Facility: CLINIC | Age: 54
End: 2024-09-23
Attending: FAMILY MEDICINE
Payer: COMMERCIAL

## 2024-09-23 VITALS — BODY MASS INDEX: 24.36 KG/M2 | WEIGHT: 130 LBS

## 2024-09-23 DIAGNOSIS — M81.0 OSTEOPOROSIS, UNSPECIFIED OSTEOPOROSIS TYPE, UNSPECIFIED PATHOLOGICAL FRACTURE PRESENCE: ICD-10-CM

## 2024-09-23 PROCEDURE — G2211 COMPLEX E/M VISIT ADD ON: HCPCS | Mod: 95 | Performed by: INTERNAL MEDICINE

## 2024-09-23 PROCEDURE — 99204 OFFICE O/P NEW MOD 45 MIN: CPT | Mod: 95 | Performed by: INTERNAL MEDICINE

## 2024-09-23 RX ORDER — ALENDRONATE SODIUM 70 MG/1
70 TABLET ORAL
Qty: 12 TABLET | Refills: 3 | Status: SHIPPED | OUTPATIENT
Start: 2024-09-23

## 2024-09-23 NOTE — LETTER
"9/23/2024      Nova Sanches  [address] [place] [id number]      Dear Colleague,    Thank you for referring your patient, Nova Sanches, to the [facility]. Please see a copy of my visit note below.    THIS IS A VIDEO VISIT:    Phone call visit/virtual visit encounter:    Name of patient: Nova Sanches    Date of encounter: 9/23/2024    Time of start of video visit: 11:32    Video started: 11:39    Video ended: 11:52    Provider location: working from home/ [facility]    Patient location: patients home.    Mode of transmission: [employer] video/ [employer]    Verbal consent: obtained before starting visit. Pt is agreeable.      The patient has been notified of following:      \"This VIDEO visit will be conducted via a call between you and your physician/provider. We have found that certain health care needs can be provided without the need for a physical exam.  This service lets us provide the care you need with a short phone conversation.  If a prescription is necessary we can send it directly to your pharmacy.  If lab work is needed we can place an order for that and you can then stop by our lab to have the test done at a later time.     With new updates with corona virus patient might be billed as clinic visit.     If during the course of the call the physician/provider feels a telephone visit is not appropriate, you will not be charged for this service.\"      Past medical history, social history, family history, allergy and medications were reviewed and updated as appropriate.  Reviewed pertinent labs, notes, imaging studies personally.    Name: Nova Sanches  Date: 9/23/2024  Seen in consultation with Serafin Cobb for osteoporosis.     HPI:  Nova Sanches is a 54 year old female who presents for the evaluation of osteoporosis.   has a past medical history of Asthma and Substance abuse (H).    New diagnosis in 2/2024.  DEXA 2/2024: OSTEOPOROSIS.   (No " prior studies available on the current scanner for comparison)    She has never been on medication for osteoporosis before.  She was interested in IV option.    GERD: No    Dental health: OK. No major upcoming dental procedure.  Has regular follow-up with dentistry.     Kidney function: within normal limits.     Previous treatment for osteopenia/osteoporosis: none    Current treatment for Osteopenia/Osteoporosis: none    Smoke:No  Family History: mother with osteoporosis in her 50s  Menstrual history/Birthing: s/p menopause in 2022 ( had Mirena removed at that time and periods never came back)  HRT after menopause: No  Fractures:Yes: foot fracture in spring 2024 ( treated non surgically)  Kidney stones: No  GI Surgery:No  Duration of therapy:  as noted above  Exercise:Yes: walking and biking. No weights  Diet:  4 servings of dairy/day  Ca/Vitamin D: 600 mg of calcium/day, 20 mcg of vit D/day  Alcohol:  h/o heavy alcohol use. In remission since 2020  Eating Disorder: Yes: h/o bulimia- still sometimes struggles with that.  Steroid Use:  No  PMH/PSH:  Past Medical History:   Diagnosis Date     Asthma     exercise induced     Substance abuse (H)     h/o EtOHism     Past Surgical History:   Procedure Laterality Date      KNEE SCOPE, DIAGNOSTIC      Description: Arthroscopy Knee;  Recorded: 04/01/2009;     TONSILLECTOMY & ADENOIDECTOMY       ZZC CAPSULOTOMY MIDFOOT,EXTENSIVE      Description: Midfoot Capsulotomy And Posterior Release W/ Tendon Lengthen;  Recorded: 04/01/2009;     ZZC RAD EXCIS PLANTAR FASCIA      Description: Radical Plantar Fasciectomy;  Recorded: 04/01/2009;     Family Hx:  Family History   Problem Relation Age of Onset     Hyperlipidemia Mother      Deep Vein Thrombosis Father         Factor V Leiden mutation     Factor V Leiden deficiency Father      Graves' disease Sister      Cerebrovascular Disease Maternal Grandmother               Social Hx:  Social History     Socioeconomic History     Marital  status:      Spouse name: Not on file     Number of children: Not on file     Years of education: Not on file     Highest education level: Not on file   Occupational History     Not on file   Tobacco Use     Smoking status: Never     Smokeless tobacco: Never   Substance and Sexual Activity     Alcohol use: No     Drug use: No     Sexual activity: Yes     Partners: Male     Comment:    Other Topics Concern     Not on file   Social History Narrative     Not on file     Social Determinants of Health     Financial Resource Strain: Low Risk  (4/8/2024)    Financial Resource Strain      Within the past 12 months, have you or your family members you live with been unable to get utilities (heat, electricity) when it was really needed?: No   Food Insecurity: Low Risk  (4/8/2024)    Food Insecurity      Within the past 12 months, did you worry that your food would run out before you got money to buy more?: No      Within the past 12 months, did the food you bought just not last and you didn t have money to get more?: No   Transportation Needs: Low Risk  (4/8/2024)    Transportation Needs      Within the past 12 months, has lack of transportation kept you from medical appointments, getting your medicines, non-medical meetings or appointments, work, or from getting things that you need?: No   Physical Activity: Sufficiently Active (4/8/2024)    Exercise Vital Sign      Days of Exercise per Week: 5 days      Minutes of Exercise per Session: 60 min   Stress: No Stress Concern Present (4/8/2024)    [place] [employer] - Occupational Stress Questionnaire      Feeling of Stress : Not at all   Social Connections: Unknown (4/8/2024)    Social Connection and Isolation Panel [NHANES]      Frequency of Communication with Friends and Family: Not on file      Frequency of Social Gatherings with Friends and Family: Twice a week      Attends Judaism Services: Not on file      Active Member of Clubs or  Organizations: Not on file      Attends Club or Organization Meetings: Not on file      Marital Status: Not on file   Interpersonal Safety: Low Risk  (4/9/2024)    Interpersonal Safety      Do you feel physically and emotionally safe where you currently live?: Yes      Within the past 12 months, have you been hit, slapped, kicked or otherwise physically hurt by someone?: No      Within the past 12 months, have you been humiliated or emotionally abused in other ways by your partner or ex-partner?: No   Housing Stability: Low Risk  (4/8/2024)    Housing Stability      Do you have housing? : Yes      Are you worried about losing your housing?: No          MEDICATIONS:  has a current medication list which includes the following prescription(s): albuterol, aspirin, calcium carbonate-vit d-min, diphenhydramine, fluoxetine, melatonin, multiple vitamins-minerals, and prazosin.    ROS     ROS: 10 point ROS neg other than the symptoms noted above in the HPI.    Physical Exam   VS: Wt 59 kg (130 lb)   LMP  (LMP Unknown)   BMI 24.36 kg/m    GENERAL: healthy, alert and no distress  EYES: Eyes grossly normal to inspection, conjunctivae and sclerae normal  ENT: no nose swelling, nasal discharge.  Thyroid: no apparent thyroid nodules  RESP: no audible wheeze, cough, or visible cyanosis.  No visible retractions or increased work of breathing.  Able to speak fully in complete sentences.  ABDO: not evaluated.  EXTREMITIES: no hand tremors.  NEURO: Cranial nerves grossly intact, mentation intact and speech normal  SKIN: No apparent skin lesions, rash or edema seen   PSYCH: mentation appears normal, affect normal/bright, judgement and insight intact, normal speech and appearance well-groomed    LABS:  Calcium:   Latest Ref Rng 4/9/2024  3:20 PM   [facility]     Calcium 8.6 - 10.0 mg/dL 9.2      Creatinine:  Creatinine   Date Value Ref Range Status   04/09/2024 0.90 0.51 - 0.95 mg/dL Final     TFTs:  Lab Results   Component  Value Date    TSH 2.81 02/06/2024     PTH:   Latest Ref Rng 4/9/2024  3:38 PM   [facility]     Parathyroid Hormone Intact 15 - 65 pg/mL 35      Vitamin D:  Vitamin D Deficiency Screening Results:  Lab Results   Component Value Date    VITDT 45 04/09/2024       DEXA 2/2024: OSTEOPOROSIS.       All pertinent notes, labs, and images personally reviewed by me.     A/P  Ms.Krista MARILYN Sanches is a 54 year old here for the evaluation of:    Osteoporosis:  DEXA 2/2024 c/w osteoporosis (new diagnosis)  She has never been on medication for osteoporosis.  Interested in IV options.  No history of acid reflux. No upcoming major dental procedure  Normal calcium, creatinine, vitamin D levels.  Risk factor include--family history, alcohol use, history of bulimia.  Plan:   Discussed diagnosis, pathophysiology, management and treatment options of condition with pt.  Discussed osteoporosis in general and medication options.  Discussed that IV medication options can be considered if there has been any side effect or intolerance to oral medication.  Discussed bisphosphonates in general.  Recommend to start Fosamax 70 mg once a week.  If there are any side effects or intolerance can consider switching to IV option.  DEXA 2/2026 onwards at [facility].  Follow-up in 1 year.    Plan: alendronate (FOSAMAX) 70 MG tablet          Risk factors for low bone density include personal history of fracture or family history, , advanced age, female, dementia, and poor health.  Also smoking, low BMI, Estrogen deficiency, low Ca intake, and alcoholism.  A prior history of vertebral fracture greatly increases the risk of subsequent fractures. A history of other medical diseases impacting on bone may also affect bone health.      Instructions on Fosamax use and side effects - particularly esophageal adverse events - are carefully reviewed with her. This drug must be taken upon arising for the day on an empty stomach, with a large 6-8  ounce glass of water; she must remain NPO in the upright position for at least 30 minutes afterwards and until after the first food of the day. If esophageal irritation is noted, she will stop the drug and call my office.  Treatment with bisphosphonate therapy will decrease fracture risk 50-70%.   There is risk of osteonecrosis of the jaw in patients using bisphosphonates is approximately 1/1700-1/100,000, with development most likely related to invasive dental procedures. If an invasive dental procedure was necessary, preferably stop the bisphosphonate approximately 3 months prior to reduce the risk. Let your dentist know that you are on this medication.  The data available at this time suggests that there is probably a small increase risk of atypical (nontraumatic) subtrochanteric fractures of the femur in patients on bisphosphonate therapy compared to those not on it. One large study suggested that for every 100 fractures prevented with bisphosphonate therapy, less than one femur fracture will occur. Other studies suggest one episode per 2,500 patient years. Patient should call with leg pain.        The pt was advised to  Maintain an adequate calcium and vitamin D intake and to supplement vitamin D if needed to maintain serum levels of 25 hydroxy D (25 OH D) between 30-60 ng/ml.  Limit alcohol intake to no more than 2 servings per day.  Limit caffeine intake.  Maintain an active lifestyle including weight-bearing exercises for at least 30 mins daily.  Take measures to reduce the risk of falling.   Discussed indications, risks and benefits of all medications prescribed, and answered questions to patient's satisfaction.   The longitudinal plan of care for the diagnosis(es)/condition(s) as documented were addressed during this visit. Due to the added complexity in care, I will continue to support Nova in the subsequent management and with ongoing continuity of care.  All questions were answered.  The patient  indicates understanding of the above issues and agrees with the plan set forth.    Follow-up:  As noted in AVS    Joyce Peraza MD  Endocrinology  [facility]  CC: Serafin Cobb      Again, thank you for allowing me to participate in the care of your patient.        Sincerely,        Joyce Peraza MD

## 2024-09-23 NOTE — PATIENT INSTRUCTIONS
[facility]  Dr Peraza, Endocrinology Department    [place] [address] [id number]  [place] [address]  Appointment [phone]  Fax: [phone]  O'Brien: Monday - Thursday      Start Fosamax 70 mg once a week.  Follow-up in 1 year (recommend in person visit)  DEXA 2026 onwards at [facility].    Instructions on Fosamax use and side effects - particularly esophageal adverse events - are carefully reviewed with her. This drug must be taken upon arising for the day on an empty stomach, with a large 6-8 ounce glass of water; she must remain NPO in the upright position for at least 30 minutes afterwards and until after the first food of the day. If esophageal irritation is noted, she will stop the drug and call my office.  Treatment with bisphosphonate therapy will decrease fracture risk 50-70%.   There is risk of osteonecrosis of the jaw in patients using bisphosphonates is approximately 1700-1/100,000, with development most likely related to invasive dental procedures. If an invasive dental procedure was necessary, preferably stop the bisphosphonate approximately 3 months prior to reduce the risk. Let your dentist know that you are on this medication.  The data available at this time suggests that there is probably a small increase risk of atypical (nontraumatic) subtrochanteric fractures of the femur in patients on bisphosphonate therapy compared to those not on it. One large study suggested that for every 100 fractures prevented with bisphosphonate therapy, less than one femur fracture will occur. Other studies suggest one episode per 2,500 patient years. Patient should call with leg pain.      The pt was advised to  Maintain an adequate calcium and vitamin D intake and to supplement vitamin D if needed to maintain serum levels of 25 hydroxy D (25 OH D) between 30-60 ng/ml.  Limit alcohol intake to no more than 2 servings per day.  Limit caffeine  intake.  Maintain an active lifestyle including weight-bearing exercises for at least 30 mins daily.  Take measures to reduce the risk of falling.     You should get 1000- 1200 mg/day calcium in divided doses of no more than 500 mg/dose.  This INCLUDES what is in your food as well as what is in any supplements you may be taking.    Vit D about 800-1000 IU/day ( unless you have vit D deficiency- in that case higher dose)  Dietary sources of calcium:: These also contain vitamin D  Milk                            8 oz            300 mg calcium  Yogurt                          1 cup           400 mg calcium   Hard cheese                     1.5 oz          300 mg  Cottage cheese                  2 cup           300 mg  Orange juice with Calcium       8 oz            300 mg  Low fat dairy sources are recommended        You should get 30 minutes of moderate weight bearing exercise on most days of the week .  Weight bearing exercise includes such things as walking, jogging, hiking, dancing.  You should also get Strength training 2 or more times/week in addition to other weight -being exercise. Strength training uses weight or resistance beyond that seen in everyday activities -(pilates, weight training with free weights, weight machines or resistance bands)     Living with Osteoporosis: Preventing Fractures  If you have osteoporosis, you can do a lot to reduce its effect on your life. Knowing how to prevent fractures and spinal curvature can help you live more comfortably and safely with this disease.  Reducing your risk for fractures  The most common fracture sites in people with osteoporosis are the wrist, spine, and hip. These fractures are often caused by accidents and falls. All fractures are painful and may limit what you can do. But hip fractures are very serious. They often need surgery, and it can take months to recover. To reduce your risk for fractures:  Get regular exercise. Try walking, swimming, or weight  training.  Eat foods that are rich in calcium, or take calcium supplements.  Make your home safe to help avoid accidents.  Take extra precautions not to fall in risky areas, such as icy sidewalks.  Understanding spinal fractures  Your spine is made up of many bones called vertebrae. Osteoporosis can cause the vertebrae in your spine to collapse. As a result, your upper back may arch forward, creating a curvature. Spine fractures may also result from back strain and bad posture. You will also lose height. Your lower spine must then adjust to keep your body balanced. This can cause back pain. To prevent or lessen these spinal changes:  Practice good posture.  Use proper techniques if you need to lift heavy objects.  Do back exercises to help your posture.  Lie on your back when you have pain.  Ask your healthcare provider about these and other ways to help your spine.  [employer] last reviewed this educational content on 5/1/2018 2000-2021 [employer]. All rights reserved. This information is not intended as a substitute for professional medical care. Always follow your healthcare professional's instructions.          Living with Osteoporosis: Regular Exercise  If you have osteoporosis, exercise is vital for your health. It can prevent bone fractures and spine changes. It will slow bone loss. Exercise will strengthen your body. It can also be fun. A variety of exercises is best. See below for exercises that can help you. But before you start, talk with your healthcare provider to be sure these exercises are right for you.   Resistance exercises. These build muscle strength and maintain bone mass. They also make you less prone to injury. Exercises include lifting small weights, doing push-ups and sit-ups, using elastic exercise bands, and using weight machines.   Weight-bearing activities. These help your whole body. They also help you maintain bone mass. Activities include walking, dancing, and  housework.   Non-weight-bearing exercises. These help prevent back strain and pain. They do this by building the trunk and leg muscles. Exercises that help with flexibility can prevent falls. Examples include swimming, water exercise, and stretching.   Staying safe  Here are tips to stay safe:   Always check with your healthcare provider before starting any new exercise program.  Use weights only as instructed.  Stop any exercise that causes pain.  [employer] last reviewed this educational content on 5/1/2018 2000-2021 [employer]. All rights reserved. This information is not intended as a substitute for professional medical care. Always follow your healthcare professional's instructions.          Preventing Osteoporosis: Avoiding Bone Loss  Certain factors can speed up bone loss or decrease bone growth. For example, alcohol, cigarettes, and certain medicines reduce bone mass. Some foods make it hard for your body to absorb calcium.  Things to avoid  Here are things to avoid to help prevent osteoporosis:  Alcohol. This is toxic to bones. It is a major cause of bone loss. Heavy drinking can cause osteoporosis even if you have no other risk factors.  Smoking. This reduces bone mass. Smoking may also interfere with estrogen levels and cause early menopause.  Inactivity. Not being active makes your bones lose strength and become thinner. Over time, thin bones may break. Women who aren't active are at a high risk for osteoporosis.  Certain medicines. Some medicines, such as cortisone, increase bone loss. They also decrease bone growth. Ask your healthcare provider about any side effects of your medicines, and how to prevent them.  Protein-rich or salty foods. Eaten in large amounts, these foods may deplete calcium.  Caffeine. This increases calcium loss. People who drink a lot of coffee, tea, or soda lose more calcium than those who don't.  [employer] last reviewed this educational content on 5/1/2018     9711-5472 [employer]. All rights reserved. This information is not intended as a substitute for professional medical care. Always follow your healthcare professional's instructions.          Preventing Osteoporosis: Meeting Your Calcium Needs  Your body needs calcium to build and repair bones. But it can't make calcium on its own. That's why it's important to eat calcium-rich foods. Some foods are naturally rich in calcium. Others have calcium added (fortified). It's best to get calcium from the foods you eat. But if you can't get enough, you may want to take calcium supplements. To meet your daily calcium needs, try the foods listed below.                          Dairy Fish & beans Other sources   Source   Calcium (mg) per serving   Source   Calcium (mg) per serving   Source   Calcium (mg) per serving   Low-fat yogurt, plain   415 mg/8 oz.   Sardines, [place], canned, with bones   351 mg/3 oz.   Oatmeal, instant, fortified   215 mg/1 cup   Nonfat milk   302 mg/1 cup   [place], sockeye, canned, with bones   239 mg/3 oz.   Tofu made with calcium sulfate   204 mg/3 oz.   Low-fat milk   297 mg/1 cup   Soybeans, fresh, boiled   131 mg/1/2 cup   Collards   179 mg/1/2 cup   Swiss cheese   272 mg/1 oz.   White beans, cooked   81 mg/1/2 cup   English muffin, whole wheat   175 mg/1 muffin   Cheddar cheese   205 mg/1 oz.   Navy beans, cooked   79 mg/1/2 cup   Kale   90 mg/1/2 cup   Ice cream strawberry   79 mg/1/2 cup           Orange, navel   56 mg/1 medium   Note: Calcium levels may vary depending on brand and size.  Daily calcium needs  14 to 18 years old: 1,300 mg  19 to 30 years old: 1,000 mg  31 to 50 years old: 1,000 mg  51 to 70 years old, women: 1,200 mg  51 to 70 years old, men: 1,000 mg  Pregnant or nursin to 18 years old: 1,300 mg, 19 to 50 years old: 1,000 mg  Older than 70 (women and men): 1,200 mg   [employer] last reviewed this educational content on 2018-2021 [employer]. All rights reserved. This information is not intended as a substitute for professional medical care. Always follow your healthcare professional's instructions.

## 2024-09-23 NOTE — PROGRESS NOTES
"THIS IS A VIDEO VISIT:    Phone call visit/virtual visit encounter:    Name of patient: Nova Sanches    Date of encounter: 9/23/2024    Time of start of video visit: 11:32    Video started: 11:39    Video ended: 11:52    Provider location: working from home/ [facility]    Patient location: patients home.    Mode of transmission: el? video/ Lumoid    Verbal consent: obtained before starting visit. Pt is agreeable.      The patient has been notified of following:      \"This VIDEO visit will be conducted via a call between you and your physician/provider. We have found that certain health care needs can be provided without the need for a physical exam.  This service lets us provide the care you need with a short phone conversation.  If a prescription is necessary we can send it directly to your pharmacy.  If lab work is needed we can place an order for that and you can then stop by our lab to have the test done at a later time.     With new updates with corona virus patient might be billed as clinic visit.     If during the course of the call the physician/provider feels a telephone visit is not appropriate, you will not be charged for this service.\"      Past medical history, social history, family history, allergy and medications were reviewed and updated as appropriate.  Reviewed pertinent labs, notes, imaging studies personally.    Name: Nova Sanches  Date: 9/23/2024  Seen in consultation with Serafin Cobb for osteoporosis.     HPI:  Nova Sanches is a 54 year old female who presents for the evaluation of osteoporosis.   has a past medical history of Asthma and Substance abuse (H).    New diagnosis in 2/2024.  DEXA 2/2024: OSTEOPOROSIS.   (No prior studies available on the current scanner for comparison)    She has never been on medication for osteoporosis before.  She was interested in IV option.    GERD: No    Dental health: OK. No major upcoming dental procedure.  Has regular " follow-up with dentistry.     Kidney function: within normal limits.     Previous treatment for osteopenia/osteoporosis: none    Current treatment for Osteopenia/Osteoporosis: none    Smoke:No  Family History: mother with osteoporosis in her 50s  Menstrual history/Birthing: s/p menopause in 2022 ( had Mirena removed at that time and periods never came back)  HRT after menopause: No  Fractures:Yes: foot fracture in spring 2024 ( treated non surgically)  Kidney stones: No  GI Surgery:No  Duration of therapy:  as noted above  Exercise:Yes: walking and biking. No weights  Diet:  4 servings of dairy/day  Ca/Vitamin D: 600 mg of calcium/day, 20 mcg of vit D/day  Alcohol:  h/o heavy alcohol use. In remission since 2020  Eating Disorder: Yes: h/o bulimia- still sometimes struggles with that.  Steroid Use:  No  PMH/PSH:  Past Medical History:   Diagnosis Date    Asthma     exercise induced    Substance abuse (H)     h/o EtOHism     Past Surgical History:   Procedure Laterality Date    HC KNEE SCOPE, DIAGNOSTIC      Description: Arthroscopy Knee;  Recorded: 04/01/2009;    TONSILLECTOMY & ADENOIDECTOMY      ZZC CAPSULOTOMY MIDFOOT,EXTENSIVE      Description: Midfoot Capsulotomy And Posterior Release W/ Tendon Lengthen;  Recorded: 04/01/2009;    ZZC RAD EXCIS PLANTAR FASCIA      Description: Radical Plantar Fasciectomy;  Recorded: 04/01/2009;     Family Hx:  Family History   Problem Relation Age of Onset    Hyperlipidemia Mother     Deep Vein Thrombosis Father         Factor V Leiden mutation    Factor V Leiden deficiency Father     Graves' disease Sister     Cerebrovascular Disease Maternal Grandmother               Social Hx:  Social History     Socioeconomic History    Marital status:      Spouse name: Not on file    Number of children: Not on file    Years of education: Not on file    Highest education level: Not on file   Occupational History    Not on file   Tobacco Use    Smoking status: Never    Smokeless  tobacco: Never   Substance and Sexual Activity    Alcohol use: No    Drug use: No    Sexual activity: Yes     Partners: Male     Comment:    Other Topics Concern    Not on file   Social History Narrative    Not on file     Social Determinants of Health     Financial Resource Strain: Low Risk  (4/8/2024)    Financial Resource Strain     Within the past 12 months, have you or your family members you live with been unable to get utilities (heat, electricity) when it was really needed?: No   Food Insecurity: Low Risk  (4/8/2024)    Food Insecurity     Within the past 12 months, did you worry that your food would run out before you got money to buy more?: No     Within the past 12 months, did the food you bought just not last and you didn t have money to get more?: No   Transportation Needs: Low Risk  (4/8/2024)    Transportation Needs     Within the past 12 months, has lack of transportation kept you from medical appointments, getting your medicines, non-medical meetings or appointments, work, or from getting things that you need?: No   Physical Activity: Sufficiently Active (4/8/2024)    Exercise Vital Sign     Days of Exercise per Week: 5 days     Minutes of Exercise per Session: 60 min   Stress: No Stress Concern Present (4/8/2024)    [employer] [place] [employer] - Occupational Stress Questionnaire     Feeling of Stress : Not at all   Social Connections: Unknown (4/8/2024)    Social Connection and Isolation Panel [NHANES]     Frequency of Communication with Friends and Family: Not on file     Frequency of Social Gatherings with Friends and Family: Twice a week     Attends Christian Services: Not on file     Active Member of Clubs or Organizations: Not on file     Attends Club or Organization Meetings: Not on file     Marital Status: Not on file   Interpersonal Safety: Low Risk  (4/9/2024)    Interpersonal Safety     Do you feel physically and emotionally safe where you currently live?: Yes      Within the past 12 months, have you been hit, slapped, kicked or otherwise physically hurt by someone?: No     Within the past 12 months, have you been humiliated or emotionally abused in other ways by your partner or ex-partner?: No   Housing Stability: Low Risk  (4/8/2024)    Housing Stability     Do you have housing? : Yes     Are you worried about losing your housing?: No          MEDICATIONS:  has a current medication list which includes the following prescription(s): albuterol, aspirin, calcium carbonate-vit d-min, diphenhydramine, fluoxetine, melatonin, multiple vitamins-minerals, and prazosin.    ROS     ROS: 10 point ROS neg other than the symptoms noted above in the HPI.    Physical Exam   VS: Wt 59 kg (130 lb)   LMP  (LMP Unknown)   BMI 24.36 kg/m    GENERAL: healthy, alert and no distress  EYES: Eyes grossly normal to inspection, conjunctivae and sclerae normal  ENT: no nose swelling, nasal discharge.  Thyroid: no apparent thyroid nodules  RESP: no audible wheeze, cough, or visible cyanosis.  No visible retractions or increased work of breathing.  Able to speak fully in complete sentences.  ABDO: not evaluated.  EXTREMITIES: no hand tremors.  NEURO: Cranial nerves grossly intact, mentation intact and speech normal  SKIN: No apparent skin lesions, rash or edema seen   PSYCH: mentation appears normal, affect normal/bright, judgement and insight intact, normal speech and appearance well-groomed    LABS:  Calcium:   Latest Ref Rng 4/9/2024  3:20 PM   [facility]     Calcium 8.6 - 10.0 mg/dL 9.2      Creatinine:  Creatinine   Date Value Ref Range Status   04/09/2024 0.90 0.51 - 0.95 mg/dL Final     TFTs:  Lab Results   Component Value Date    TSH 2.81 02/06/2024     PTH:   Latest Ref Rng 4/9/2024  3:38 PM   [facility]     Parathyroid Hormone Intact 15 - 65 pg/mL 35      Vitamin D:  Vitamin D Deficiency Screening Results:  Lab Results   Component Value Date    VITDT 45 04/09/2024        DEXA 2/2024: OSTEOPOROSIS.       All pertinent notes, labs, and images personally reviewed by me.     A/P  Ms.Krista MARILYN Sanches is a 54 year old here for the evaluation of:    Osteoporosis:  DEXA 2/2024 c/w osteoporosis (new diagnosis)  She has never been on medication for osteoporosis.  Interested in IV options.  No history of acid reflux. No upcoming major dental procedure  Normal calcium, creatinine, vitamin D levels.  Risk factor include--family history, alcohol use, history of bulimia.  Plan:   Discussed diagnosis, pathophysiology, management and treatment options of condition with pt.  Discussed osteoporosis in general and medication options.  Discussed that IV medication options can be considered if there has been any side effect or intolerance to oral medication.  Discussed bisphosphonates in general.  Recommend to start Fosamax 70 mg once a week.  If there are any side effects or intolerance can consider switching to IV option.  DEXA 2/2026 onwards at [facility].  Follow-up in 1 year.    Plan: alendronate (FOSAMAX) 70 MG tablet          Risk factors for low bone density include personal history of fracture or family history, , advanced age, female, dementia, and poor health.  Also smoking, low BMI, Estrogen deficiency, low Ca intake, and alcoholism.  A prior history of vertebral fracture greatly increases the risk of subsequent fractures. A history of other medical diseases impacting on bone may also affect bone health.      Instructions on Fosamax use and side effects - particularly esophageal adverse events - are carefully reviewed with her. This drug must be taken upon arising for the day on an empty stomach, with a large 6-8 ounce glass of water; she must remain NPO in the upright position for at least 30 minutes afterwards and until after the first food of the day. If esophageal irritation is noted, she will stop the drug and call my office.  Treatment with bisphosphonate therapy will  decrease fracture risk 50-70%.   There is risk of osteonecrosis of the jaw in patients using bisphosphonates is approximately 1/1700-1/100,000, with development most likely related to invasive dental procedures. If an invasive dental procedure was necessary, preferably stop the bisphosphonate approximately 3 months prior to reduce the risk. Let your dentist know that you are on this medication.  The data available at this time suggests that there is probably a small increase risk of atypical (nontraumatic) subtrochanteric fractures of the femur in patients on bisphosphonate therapy compared to those not on it. One large study suggested that for every 100 fractures prevented with bisphosphonate therapy, less than one femur fracture will occur. Other studies suggest one episode per 2,500 patient years. Patient should call with leg pain.        The pt was advised to  Maintain an adequate calcium and vitamin D intake and to supplement vitamin D if needed to maintain serum levels of 25 hydroxy D (25 OH D) between 30-60 ng/ml.  Limit alcohol intake to no more than 2 servings per day.  Limit caffeine intake.  Maintain an active lifestyle including weight-bearing exercises for at least 30 mins daily.  Take measures to reduce the risk of falling.   Discussed indications, risks and benefits of all medications prescribed, and answered questions to patient's satisfaction.   The longitudinal plan of care for the diagnosis(es)/condition(s) as documented were addressed during this visit. Due to the added complexity in care, I will continue to support Nova in the subsequent management and with ongoing continuity of care.  All questions were answered.  The patient indicates understanding of the above issues and agrees with the plan set forth.    Follow-up:  As noted in AVS    Joyce Peraza MD  Endocrinology  [facility]  CC: Serafin Cobb

## 2024-09-23 NOTE — NURSING NOTE
Current patient location:  [place]    Is the patient currently in the state of [place]? YES    Visit mode:VIDEO    If the visit is dropped, the patient can be reconnected by: VIDEO VISIT: Text to cell phone:   Telephone Information:   Mobile [phone]       Will anyone else be joining the visit? NO  (If patient encounters technical issues they should call [phone] :[phone])    How would you like to obtain your AVS? MyChart    Are changes needed to the allergy or medication list? No    Are refills needed on medications prescribed by this physician? NO    Rooming Documentation:  Questionnaire(s) completed    Reason for visit: Video Visit and RECHECK    Mary BEVERLY

## 2024-10-25 ASSESSMENT — ASTHMA QUESTIONNAIRES
QUESTION_5 LAST FOUR WEEKS HOW WOULD YOU RATE YOUR ASTHMA CONTROL: COMPLETELY CONTROLLED
ACT_TOTALSCORE: 25
ACT_TOTALSCORE: 25
QUESTION_3 LAST FOUR WEEKS HOW OFTEN DID YOUR ASTHMA SYMPTOMS (WHEEZING, COUGHING, SHORTNESS OF BREATH, CHEST TIGHTNESS OR PAIN) WAKE YOU UP AT NIGHT OR EARLIER THAN USUAL IN THE MORNING: NOT AT ALL
QUESTION_4 LAST FOUR WEEKS HOW OFTEN HAVE YOU USED YOUR RESCUE INHALER OR NEBULIZER MEDICATION (SUCH AS ALBUTEROL): NOT AT ALL
QUESTION_1 LAST FOUR WEEKS HOW MUCH OF THE TIME DID YOUR ASTHMA KEEP YOU FROM GETTING AS MUCH DONE AT WORK, SCHOOL OR AT HOME: NONE OF THE TIME
QUESTION_2 LAST FOUR WEEKS HOW OFTEN HAVE YOU HAD SHORTNESS OF BREATH: NOT AT ALL

## 2024-10-29 ENCOUNTER — OFFICE VISIT (OUTPATIENT)
Dept: FAMILY MEDICINE | Facility: CLINIC | Age: 54
End: 2024-10-29
Payer: COMMERCIAL

## 2024-10-29 VITALS
HEART RATE: 86 BPM | WEIGHT: 128.3 LBS | SYSTOLIC BLOOD PRESSURE: 90 MMHG | BODY MASS INDEX: 24.04 KG/M2 | TEMPERATURE: 97.7 F | DIASTOLIC BLOOD PRESSURE: 68 MMHG | RESPIRATION RATE: 14 BRPM | OXYGEN SATURATION: 94 %

## 2024-10-29 DIAGNOSIS — R73.01 IMPAIRED FASTING GLUCOSE: ICD-10-CM

## 2024-10-29 DIAGNOSIS — D12.6 TUBULAR ADENOMA OF COLON: ICD-10-CM

## 2024-10-29 DIAGNOSIS — F33.42 RECURRENT MAJOR DEPRESSIVE DISORDER, IN FULL REMISSION (H): Primary | ICD-10-CM

## 2024-10-29 DIAGNOSIS — M81.0 OSTEOPOROSIS, UNSPECIFIED OSTEOPOROSIS TYPE, UNSPECIFIED PATHOLOGICAL FRACTURE PRESENCE: ICD-10-CM

## 2024-10-29 DIAGNOSIS — J45.30 MILD PERSISTENT ASTHMA WITHOUT COMPLICATION: ICD-10-CM

## 2024-10-29 DIAGNOSIS — Z23 ENCOUNTER FOR IMMUNIZATION: ICD-10-CM

## 2024-10-29 DIAGNOSIS — C43.9 MELANOMA OF SKIN (H): ICD-10-CM

## 2024-10-29 DIAGNOSIS — F10.21 ALCOHOL DEPENDENCE, IN REMISSION (H): ICD-10-CM

## 2024-10-29 DIAGNOSIS — F51.5 NIGHTMARES: ICD-10-CM

## 2024-10-29 LAB
EST. AVERAGE GLUCOSE BLD GHB EST-MCNC: 114 MG/DL
HBA1C MFR BLD: 5.6 % (ref 0–5.6)

## 2024-10-29 PROCEDURE — 90480 ADMN SARSCOV2 VAC 1/ONLY CMP: CPT | Performed by: FAMILY MEDICINE

## 2024-10-29 PROCEDURE — 90673 RIV3 VACCINE NO PRESERV IM: CPT | Performed by: FAMILY MEDICINE

## 2024-10-29 PROCEDURE — 91320 SARSCV2 VAC 30MCG TRS-SUC IM: CPT | Performed by: FAMILY MEDICINE

## 2024-10-29 PROCEDURE — 90471 IMMUNIZATION ADMIN: CPT | Performed by: FAMILY MEDICINE

## 2024-10-29 PROCEDURE — 99214 OFFICE O/P EST MOD 30 MIN: CPT | Mod: 25 | Performed by: FAMILY MEDICINE

## 2024-10-29 PROCEDURE — 36415 COLL VENOUS BLD VENIPUNCTURE: CPT | Performed by: FAMILY MEDICINE

## 2024-10-29 PROCEDURE — 80048 BASIC METABOLIC PNL TOTAL CA: CPT | Performed by: FAMILY MEDICINE

## 2024-10-29 PROCEDURE — 83036 HEMOGLOBIN GLYCOSYLATED A1C: CPT | Performed by: FAMILY MEDICINE

## 2024-10-29 RX ORDER — NALTREXONE HYDROCHLORIDE 50 MG/1
1 TABLET, FILM COATED ORAL PRN
COMMUNITY
Start: 2024-10-01

## 2024-10-29 ASSESSMENT — PAIN SCALES - GENERAL: PAINLEVEL_OUTOF10: NO PAIN (0)

## 2024-10-29 NOTE — PROGRESS NOTES
Assessment/Plan:    Recurrent major depressive disorder, in full remission (H)  Underlying depression noted.  In remission.  Continues fluoxetine 20 mg daily.  - Basic metabolic panel    Nightmares  Continues benefits of prazosin 2 mg at bedtime.    Alcohol dependence, in remission (H)  Uses naltrexone 50 mg daily but more on as-needed basis if under stress or risk of relapse.    Mild persistent asthma without complication  Not requiring albuterol MDI.  Does not require refill at this time.    Impaired fasting glucose  Impaired fasting glucose historically and will update fasting glucose and A1c.  - Basic metabolic panel  - Hemoglobin A1c    Tubular adenoma of colon  Tubular adenoma colon on prior colonoscopy January 15, 2021 and will repeat at 5-year interval.    Osteoporosis, unspecified osteoporosis type, unspecified pathological fracture presence  Osteoporosis.  Continues Caltrate 600/200 using 1 tablet twice daily.  Beginning Fosamax when she returns from [facility] where she will be traveling tomorrow for her nephew's wedding.    Encounter for immunization  Flublok and COVID booster provided.  - INFLUENZA VACCINE TRIVALENT(FLUBLOK)  - COVID-19 12+ (PFIZER)    Melanoma right anterior shin  Status post excisional biopsy May 2024 and has 3-month follow-up visits with dermatology for monitoring.               Subjective:    Nova Sanches is seen today for follow-up assessment.  Underlying depression.  Fluoxetine 20 mg daily.  Nightmares treated with prazosin 2 mg at bedtime.  Naltrexone 50 mg daily for alcohol dependence but utilizes more on a as needed basis under times of stress or risk of relapse.  Not requiring albuterol MDI for mild persistent asthma and would like flu shot and COVID updated booster.  Impaired fasting glucose historically, mild.  Tubular adenoma of colon on colonoscopy January 15, 2021 and told repeat at 5-year interval.  Due to underlying osteoporosis is stiff begin Fosamax 70 mg weekly but  "wants to wait until after returning from upcoming trip to [facility] for her nephew's wedding.  Melanoma excision right anterior shin.  Follows with dermatology every 3 months.  Comprehensive review of systems as above otherwise all negative.      Answers submitted by the patient for this visit:  Lipid Visit (Submitted on 10/25/2024)  Chief Complaint: Chronic problems general questions HPI Form  Are you regularly taking any medication or supplement to lower your cholesterol?: No  Are you having muscle aches or other side effects that you think could be caused by your cholesterol lowering medication?: No  Patient Health Questionnaire (Submitted on 10/28/2024)  If you checked off any problems, how difficult have these problems made it for you to do your work, take care of things at home, or get along with other people?: Not difficult at all  PHQ9 TOTAL SCORE: 0  General Questionnaire (Submitted on 10/25/2024)  Chief Complaint: Chronic problems general questions HPI Form  How many servings of fruits and vegetables do you eat daily?: 4 or more  On average, how many sweetened beverages do you drink each day (Examples: soda, juice, sweet tea, etc.  Do NOT count diet or artificially sweetened beverages)?: 0  How many minutes a day do you exercise enough to make your heart beat faster?: 30 to 60  How many days a week do you exercise enough to make your heart beat faster?: 5  How many days per week do you miss taking your medication?: 0  Questionnaire about: Chronic problems general questions HPI Form (Submitted on 10/25/2024)  Chief Complaint: Chronic problems general questions HPI Form         \"Cm\" x 92   2 daughters - Deann and Chata   Doesn't talk to parents at all...   Dad - DVT (Factor V Leiden)   Mom - high chol, osteoarthritis; type 2 DM with obesity   2 bros - one  due to suicide 8/15/2006 (age 37), depression, EtOH   1 sis -   [employer]   Work: Pharmacist, now at [employer]; " prior [employer]   Surgeries:  s/p club foot release, T & A, plantar fascial release; right hip arthroscopy with torn labrum in 2019   Hospitalizations:   x 2   No smoke   No EtOH x 20: Per patient, occasional EtOH only... (see above ? EtOHism) Followed through [facility] ([phone])   BCC on chest and both arms (f/u 10/18/13 at [facility] Dr. Crystal Lopez) Merena IUD placed in Dec, 2012   [facility] Dr[facility] January 10, 2018 for physical exam with , TG 76, HDL 77, and .       Past Surgical History:   Procedure Laterality Date    HC KNEE SCOPE, DIAGNOSTIC      Description: Arthroscopy Knee;  Recorded: 2009;    TONSILLECTOMY & ADENOIDECTOMY      Z CAPSULOTOMY MIDFOOT,EXTENSIVE      Description: Midfoot Capsulotomy And Posterior Release W/ Tendon Lengthen;  Recorded: 2009;    ZC RAD EXCIS PLANTAR FASCIA      Description: Radical Plantar Fasciectomy;  Recorded: 2009;        Family History   Problem Relation Age of Onset    Hyperlipidemia Mother     Deep Vein Thrombosis Father         Factor V Leiden mutation    Factor V Leiden deficiency Father     Graves' disease Sister     Cerebrovascular Disease Maternal Grandmother         Past Medical History:   Diagnosis Date    Asthma     exercise induced    Substance abuse (H)     h/o EtOHism        Social History     Tobacco Use    Smoking status: Never    Smokeless tobacco: Never   Substance Use Topics    Alcohol use: No    Drug use: No        Current Outpatient Medications   Medication Sig Dispense Refill    alendronate (FOSAMAX) 70 MG tablet Take 1 tablet (70 mg) by mouth every 7 days. 12 tablet 3    aspirin 81 mg chewable tablet [ASPIRIN 81 MG CHEWABLE TABLET] Chew 81 mg daily.      calcium carbonate-vit D3-min (CALCIUM-VITAMIN D) 600 mg calcium- 400 unit Tab [CALCIUM CARBONATE-VIT D3-MIN (CALCIUM-VITAMIN D) 600 MG CALCIUM- 400 UNIT TAB]  Take 1 tablet by mouth 2 (two) times a day.      diphenhydrAMINE (BENADRYL) 50 MG capsule [DIPHENHYDRAMINE (BENADRYL) 50 MG CAPSULE] Take 50 mg by mouth at bedtime. PT TAKING 100 MG      FLUoxetine (PROZAC) 20 MG capsule Take 1 capsule (20 mg) by mouth daily 30 capsule 0    melatonin 5 mg cap [MELATONIN 5 MG CAP] Take by mouth.      MULTIVIT &MINERALS/FERROUS FUM (MULTI VITAMIN ORAL) [MULTIVIT &MINERALS/FERROUS FUM (MULTI VITAMIN ORAL)] Take 1 tablet by mouth daily.      naltrexone (DEPADE/REVIA) 50 MG tablet Take 1 tablet by mouth as needed.      prazosin (MINIPRESS) 2 MG capsule [PRAZOSIN (MINIPRESS) 2 MG CAPSULE]       albuterol (PROAIR HFA/PROVENTIL HFA/VENTOLIN HFA) 108 (90 Base) MCG/ACT inhaler Inhale 2 puffs into the lungs every 4 hours as needed for wheezing 18 g 5          Objective:    Vitals:    10/29/24 0919   BP: 90/68   BP Location: Left arm   Patient Position: Sitting   Cuff Size: Adult Regular   Pulse: 86   Resp: 14   Temp: 97.7  F (36.5  C)   TempSrc: Temporal   SpO2: 94%   Weight: 58.2 kg (128 lb 4.8 oz)      Body mass index is 24.04 kg/m .    Alert.  No apparent distress.  Chest clear.  Cardiac exam regular.  She remains warm and dry.  Right anterior shin excision site with appearance of minimal residual linear scarring.      This note has been dictated using voice recognition software and as a result may contain minor grammatical errors and unintended word substitutions.

## 2024-10-30 LAB
ANION GAP SERPL CALCULATED.3IONS-SCNC: 11 MMOL/L (ref 7–15)
BUN SERPL-MCNC: 8.8 MG/DL (ref 6–20)
CALCIUM SERPL-MCNC: 8.6 MG/DL (ref 8.8–10.4)
CHLORIDE SERPL-SCNC: 106 MMOL/L (ref 98–107)
CREAT SERPL-MCNC: 0.91 MG/DL (ref 0.51–0.95)
EGFRCR SERPLBLD CKD-EPI 2021: 74 ML/MIN/1.73M2
GLUCOSE SERPL-MCNC: 87 MG/DL (ref 70–99)
HCO3 SERPL-SCNC: 23 MMOL/L (ref 22–29)
POTASSIUM SERPL-SCNC: 4.4 MMOL/L (ref 3.4–5.3)
SODIUM SERPL-SCNC: 140 MMOL/L (ref 135–145)

## 2024-11-07 ENCOUNTER — TRANSFERRED RECORDS (OUTPATIENT)
Dept: HEALTH INFORMATION MANAGEMENT | Facility: CLINIC | Age: 54
End: 2024-11-07
Payer: COMMERCIAL

## 2025-01-07 ENCOUNTER — PATIENT OUTREACH (OUTPATIENT)
Dept: CARE COORDINATION | Facility: CLINIC | Age: 55
End: 2025-01-07
Payer: COMMERCIAL

## 2025-01-21 ENCOUNTER — PATIENT OUTREACH (OUTPATIENT)
Dept: CARE COORDINATION | Facility: CLINIC | Age: 55
End: 2025-01-21
Payer: COMMERCIAL

## 2025-02-04 ENCOUNTER — TRANSFERRED RECORDS (OUTPATIENT)
Dept: HEALTH INFORMATION MANAGEMENT | Facility: CLINIC | Age: 55
End: 2025-02-04
Payer: COMMERCIAL

## 2025-03-23 ENCOUNTER — HEALTH MAINTENANCE LETTER (OUTPATIENT)
Age: 55
End: 2025-03-23

## 2025-03-31 ENCOUNTER — VIRTUAL VISIT (OUTPATIENT)
Dept: FAMILY MEDICINE | Facility: CLINIC | Age: 55
End: 2025-03-31
Payer: COMMERCIAL

## 2025-03-31 ENCOUNTER — ANCILLARY PROCEDURE (OUTPATIENT)
Dept: GENERAL RADIOLOGY | Facility: CLINIC | Age: 55
End: 2025-03-31
Attending: FAMILY MEDICINE
Payer: COMMERCIAL

## 2025-03-31 ENCOUNTER — PATIENT OUTREACH (OUTPATIENT)
Dept: CARE COORDINATION | Facility: CLINIC | Age: 55
End: 2025-03-31

## 2025-03-31 DIAGNOSIS — R68.83 CHILLS: ICD-10-CM

## 2025-03-31 DIAGNOSIS — J45.30 MILD PERSISTENT ASTHMA WITHOUT COMPLICATION: ICD-10-CM

## 2025-03-31 DIAGNOSIS — R05.1 ACUTE COUGH: ICD-10-CM

## 2025-03-31 DIAGNOSIS — R05.1 ACUTE COUGH: Primary | ICD-10-CM

## 2025-03-31 PROCEDURE — 71046 X-RAY EXAM CHEST 2 VIEWS: CPT | Mod: TC | Performed by: RADIOLOGY

## 2025-03-31 PROCEDURE — 98006 SYNCH AUDIO-VIDEO EST MOD 30: CPT | Performed by: FAMILY MEDICINE

## 2025-03-31 RX ORDER — FLUTICASONE PROPIONATE AND SALMETEROL 100; 50 UG/1; UG/1
1 POWDER RESPIRATORY (INHALATION) EVERY 12 HOURS
Qty: 14 EACH | Refills: 1 | Status: SHIPPED | OUTPATIENT
Start: 2025-03-31

## 2025-03-31 ASSESSMENT — ENCOUNTER SYMPTOMS: COUGH: 1

## 2025-03-31 NOTE — PROGRESS NOTES
Nova is a 55 year old who is being evaluated via a billable video visit.    How would you like to obtain your AVS? MyChart  If the video visit is dropped, the invitation should be resent by: Text to cell phone: [phone]  Will anyone else be joining your video visit? No      Assessment & Plan     Acute cough  Possible pneumonia, cough sounds harsh, however patient denies wheezing or chest tightness, plan for swabs and xray to rule out pneumonia. Recommend using albuterol and advair as needed to keep airways open during URI with hx of asthma. If worsening symptoms she should be seen in person.   - XR Chest 2 Views  - COVID-19 Virus (Coronavirus) by PCR  - Influenza A & B Antigen - Clinic Collect  - fluticasone-salmeterol (ADVAIR) 100-50 MCG/ACT inhaler  Dispense: 14 each; Refill: 1    Chills  - XR Chest 2 Views  - COVID-19 Virus (Coronavirus) by PCR  - Influenza A & B Antigen - Clinic Collect    Mild persistent asthma without complication  - fluticasone-salmeterol (ADVAIR) 100-50 MCG/ACT inhaler  Dispense: 14 each; Refill: 1                  Subjective   Nova is a 55 year old, presenting for the following health issues:  Cough        3/31/2025     7:14 AM   Additional Questions   Roomed by Faith YAN MA   Accompanied by Self     History of Present Illness       Reason for visit:  Cough & fever  Symptom onset:  1-3 days ago  Symptoms include:  Cough & fever  Symptom intensity:  Severe  Symptom progression:  Staying the same  Had these symptoms before:  No  What makes it worse:  Talking triggers coughing  What makes it better:  Sleeping   She is taking medications regularly.            Review of Systems  Constitutional, HEENT, cardiovascular, pulmonary, gi and gu systems are negative, except as otherwise noted.      Objective           Vitals:  No vitals were obtained today due to virtual visit.    Physical Exam   GENERAL: alert and no distress  EYES: Eyes grossly normal to inspection.  No discharge or erythema,  or obvious scleral/conjunctival abnormalities.  RESP: No audible wheeze, cough, or visible cyanosis.    SKIN: Visible skin clear. No significant rash, abnormal pigmentation or lesions.  NEURO: Cranial nerves grossly intact.  Mentation and speech appropriate for age.  PSYCH: Appropriate affect, tone, and pace of words        Video-Visit Details    Type of service:  Video Visit   Originating Location (pt. Location): Home  Distant Location (provider location):  Off-site  Platform used for Video Visit: Seamus  Signed Electronically by: Crystal Massey DO

## 2025-04-03 ENCOUNTER — MYC MEDICAL ADVICE (OUTPATIENT)
Dept: FAMILY MEDICINE | Facility: CLINIC | Age: 55
End: 2025-04-03
Payer: COMMERCIAL

## 2025-04-28 ASSESSMENT — ASTHMA QUESTIONNAIRES
QUESTION_3 LAST FOUR WEEKS HOW OFTEN DID YOUR ASTHMA SYMPTOMS (WHEEZING, COUGHING, SHORTNESS OF BREATH, CHEST TIGHTNESS OR PAIN) WAKE YOU UP AT NIGHT OR EARLIER THAN USUAL IN THE MORNING: TWO OR THREE NIGHTS A WEEK
QUESTION_5 LAST FOUR WEEKS HOW WOULD YOU RATE YOUR ASTHMA CONTROL: SOMEWHAT CONTROLLED
QUESTION_2 LAST FOUR WEEKS HOW OFTEN HAVE YOU HAD SHORTNESS OF BREATH: MORE THAN ONCE A DAY
ACT_TOTALSCORE: 9
EMERGENCY_ROOM_LAST_YEAR_TOTAL: ONE
QUESTION_4 LAST FOUR WEEKS HOW OFTEN HAVE YOU USED YOUR RESCUE INHALER OR NEBULIZER MEDICATION (SUCH AS ALBUTEROL): THREE OR MORE TIMES PER DAY
QUESTION_1 LAST FOUR WEEKS HOW MUCH OF THE TIME DID YOUR ASTHMA KEEP YOU FROM GETTING AS MUCH DONE AT WORK, SCHOOL OR AT HOME: MOST OF THE TIME

## 2025-04-28 ASSESSMENT — PATIENT HEALTH QUESTIONNAIRE - PHQ9
10. IF YOU CHECKED OFF ANY PROBLEMS, HOW DIFFICULT HAVE THESE PROBLEMS MADE IT FOR YOU TO DO YOUR WORK, TAKE CARE OF THINGS AT HOME, OR GET ALONG WITH OTHER PEOPLE: NOT DIFFICULT AT ALL
SUM OF ALL RESPONSES TO PHQ QUESTIONS 1-9: 0
SUM OF ALL RESPONSES TO PHQ QUESTIONS 1-9: 0

## 2025-04-29 ENCOUNTER — OFFICE VISIT (OUTPATIENT)
Dept: FAMILY MEDICINE | Facility: CLINIC | Age: 55
End: 2025-04-29
Payer: COMMERCIAL

## 2025-04-29 VITALS
SYSTOLIC BLOOD PRESSURE: 100 MMHG | OXYGEN SATURATION: 95 % | HEIGHT: 61 IN | HEART RATE: 81 BPM | RESPIRATION RATE: 17 BRPM | DIASTOLIC BLOOD PRESSURE: 68 MMHG | WEIGHT: 130 LBS | BODY MASS INDEX: 24.55 KG/M2 | TEMPERATURE: 98.1 F

## 2025-04-29 DIAGNOSIS — E78.00 HYPERCHOLESTEROLEMIA: ICD-10-CM

## 2025-04-29 DIAGNOSIS — F10.21 ALCOHOL DEPENDENCE, IN REMISSION (H): ICD-10-CM

## 2025-04-29 DIAGNOSIS — F33.42 RECURRENT MAJOR DEPRESSIVE DISORDER, IN FULL REMISSION: Primary | ICD-10-CM

## 2025-04-29 PROCEDURE — 99214 OFFICE O/P EST MOD 30 MIN: CPT | Performed by: FAMILY MEDICINE

## 2025-04-29 PROCEDURE — 3078F DIAST BP <80 MM HG: CPT | Performed by: FAMILY MEDICINE

## 2025-04-29 PROCEDURE — 3074F SYST BP LT 130 MM HG: CPT | Performed by: FAMILY MEDICINE

## 2025-04-29 PROCEDURE — 1126F AMNT PAIN NOTED NONE PRSNT: CPT | Performed by: FAMILY MEDICINE

## 2025-04-29 RX ORDER — FLUOXETINE 10 MG/1
20 CAPSULE ORAL DAILY
COMMUNITY
Start: 2025-04-14

## 2025-04-29 RX ORDER — FLUOXETINE HYDROCHLORIDE 40 MG/1
40 CAPSULE ORAL DAILY
COMMUNITY
Start: 2025-04-12

## 2025-04-29 ASSESSMENT — ANXIETY QUESTIONNAIRES
5. BEING SO RESTLESS THAT IT IS HARD TO SIT STILL: NOT AT ALL
GAD7 TOTAL SCORE: 0
1. FEELING NERVOUS, ANXIOUS, OR ON EDGE: NOT AT ALL
GAD7 TOTAL SCORE: 0
7. FEELING AFRAID AS IF SOMETHING AWFUL MIGHT HAPPEN: NOT AT ALL
6. BECOMING EASILY ANNOYED OR IRRITABLE: NOT AT ALL
2. NOT BEING ABLE TO STOP OR CONTROL WORRYING: NOT AT ALL
4. TROUBLE RELAXING: NOT AT ALL
IF YOU CHECKED OFF ANY PROBLEMS ON THIS QUESTIONNAIRE, HOW DIFFICULT HAVE THESE PROBLEMS MADE IT FOR YOU TO DO YOUR WORK, TAKE CARE OF THINGS AT HOME, OR GET ALONG WITH OTHER PEOPLE: NOT DIFFICULT AT ALL
3. WORRYING TOO MUCH ABOUT DIFFERENT THINGS: NOT AT ALL
7. FEELING AFRAID AS IF SOMETHING AWFUL MIGHT HAPPEN: NOT AT ALL
GAD7 TOTAL SCORE: 0
8. IF YOU CHECKED OFF ANY PROBLEMS, HOW DIFFICULT HAVE THESE MADE IT FOR YOU TO DO YOUR WORK, TAKE CARE OF THINGS AT HOME, OR GET ALONG WITH OTHER PEOPLE?: NOT DIFFICULT AT ALL

## 2025-04-29 ASSESSMENT — PAIN SCALES - GENERAL: PAINLEVEL_OUTOF10: NO PAIN (0)

## 2025-04-29 NOTE — PROGRESS NOTES
"Assessment/Plan:    Recurrent major depressive disorder, in full remission  Underlying depression now in remission with PHQ-9 questionnaire 0 out of 27 and JUJU-7 questionnaire 0 out of 21.  Patient will continue fluoxetine 60 mg daily until around July 1 and then may cut back to 40 mg daily.  Anticipate reassessment at follow-up in 6 months.  Sooner with concerns.    Alcohol dependence, in remission (H)  Alcohol dependence in remission.  Relapsed unfortunately after attending a wedding in [place] in November.  Was exposed to sake at that time and continued to have \"the smell of sake and her mind\".  Alcohol use around Christmas and then blackout alcohol consumption in January on a Friday night.  Did discuss shame associated with relapse etc.  Did encourage patient to continue to ensure checking in with  as well as AA consistently to determine if other triggers that are causing her to struggle with daily sobriety.    Hypercholesterolemia  Hypercholesterolemia with 10-year cardiovascular risk 1.4% identified.  Continue dietary and exercise modifications with reassessment at follow-up in 6 months               Subjective:    Nova Sanches is seen today for follow-up assessment.  Underlying depression.  Has been on fluoxetine 20 mg daily.  Had increased to 40 mg daily dose in December then 60 mg in February after working with her therapist.  Patient did have relapse unfortunately of alcohol consumption with vodka to a point of blacking out in January.   close to leaving her if recurrent concerns.  Daughters remain supportive as well.  Patient had been exposed to alcohol consumption in November while attending a wedding in [place] and then could not get this out of her mind with alcohol use subsequently in December and subsequently to the point of blackout as described in January.  Has maintained sobriety since.  Naltrexone 50 mg daily.  Attends AA meetings.  Cholesterol elevation historically.  " "Comprehensive review of systems as above otherwise all negative.    The 10-year ASCVD risk score (Lane ONOFRE, et al., 2019) is: 1.4%    Values used to calculate the score:      Age: 55 years      Sex: Female      Is Non- : No      Diabetic: No      Tobacco smoker: No      Systolic Blood Pressure: 100 mmHg      Is BP treated: No      HDL Cholesterol: 81 mg/dL      Total Cholesterol: 270 mg/dL      \"Cm\" x 92   2 daughters - Deann (26) and Chata (24)  Doesn't talk to parents at all...   Dad - DVT (Factor V Leiden)   Mom - high chol, osteoarthritis; type 2 DM with obesity   2 bros - one  due to suicide 8/15/2006 (age 37), depression, EtOH   1 [employer]ers[employer] -   [employer]   Work: Pharmacist, now at [employer]; prior [employer]   Surgeries:  s/p club foot release, T & A, plantar fascial release; right hip arthroscopy with torn labrum in 2019   Hospitalizations:   x 2   No smoke   No EtOH x 20: Per patient, occasional EtOH only... (see above ? EtOHism) Followed through [facility] ([phone])   BCC on chest and both arms (f/u 10/18/13 at [facility] Dr. Crystal Lopez) Merena IUD placed in Dec, 2012   [facility] Dr[facility] January 10, 2018 for physical exam with , TG 76, HDL 77, and .         Past Surgical History:   Procedure Laterality Date    HC KNEE SCOPE, DIAGNOSTIC      Description: Arthroscopy Knee;  Recorded: 2009;    TONSILLECTOMY & ADENOIDECTOMY      ZZC CAPSULOTOMY MIDFOOT,EXTENSIVE      Description: Midfoot Capsulotomy And Posterior Release W/ Tendon Lengthen;  Recorded: 2009;    ZZC RAD EXCIS PLANTAR FASCIA      Description: Radical Plantar Fasciectomy;  Recorded: 2009;        Family History   Problem Relation Age of Onset    Hyperlipidemia Mother     Deep Vein Thrombosis Father         Factor V Leiden mutation    Factor V Leiden " deficiency Father     Graves' disease Sister     Cerebrovascular Disease Maternal Grandmother         Past Medical History:   Diagnosis Date    Asthma     exercise induced    Substance abuse (H)     h/o EtOHism        Social History     Tobacco Use    Smoking status: Never     Passive exposure: Never    Smokeless tobacco: Never   Vaping Use    Vaping status: Never Used   Substance Use Topics    Alcohol use: No    Drug use: No        Current Outpatient Medications   Medication Sig Dispense Refill    albuterol (PROAIR HFA/PROVENTIL HFA/VENTOLIN HFA) 108 (90 Base) MCG/ACT inhaler Inhale 2 puffs into the lungs every 4 hours as needed for wheezing 18 g 5    alendronate (FOSAMAX) 70 MG tablet Take 1 tablet (70 mg) by mouth every 7 days. 12 tablet 3    aspirin 81 mg chewable tablet [ASPIRIN 81 MG CHEWABLE TABLET] Chew 81 mg daily.      calcium carbonate-vit D3-min (CALCIUM-VITAMIN D) 600 mg calcium- 400 unit Tab [CALCIUM CARBONATE-VIT D3-MIN (CALCIUM-VITAMIN D) 600 MG CALCIUM- 400 UNIT TAB] Take 1 tablet by mouth 2 (two) times a day.      diphenhydrAMINE (BENADRYL) 50 MG capsule [DIPHENHYDRAMINE (BENADRYL) 50 MG CAPSULE] Take 50 mg by mouth at bedtime. PT TAKING 100 MG      FLUoxetine (PROZAC) 10 MG capsule Take 20 mg by mouth daily.      FLUoxetine (PROZAC) 40 MG capsule Take 40 mg by mouth daily.      fluticasone-salmeterol (ADVAIR) 100-50 MCG/ACT inhaler Inhale 1 puff into the lungs every 12 hours. 14 each 1    melatonin 5 mg cap [MELATONIN 5 MG CAP] Take by mouth.      MULTIVIT &MINERALS/FERROUS FUM (MULTI VITAMIN ORAL) [MULTIVIT &MINERALS/FERROUS FUM (MULTI VITAMIN ORAL)] Take 1 tablet by mouth daily.      naltrexone (DEPADE/REVIA) 50 MG tablet Take 1 tablet by mouth as needed.      prazosin (MINIPRESS) 2 MG capsule [PRAZOSIN (MINIPRESS) 2 MG CAPSULE]       FLUoxetine (PROZAC) 20 MG capsule Take 1 capsule (20 mg) by mouth daily (Patient not taking: Reported on 4/29/2025) 30 capsule 0          Objective:    Vitals:     "04/29/25 0921   BP: 100/68   Pulse: 81   Resp: 17   Temp: 98.1  F (36.7  C)   SpO2: 95%   Weight: 59 kg (130 lb)   Height: 1.556 m (5' 1.25\")      Body mass index is 24.36 kg/m .    Alert.  No apparent distress.  Cooperative and forthcoming.  Pleasant.      This note has been dictated using voice recognition software and as a result may contain minor grammatical errors and unintended word substitutions.       Answers submitted by the patient for this visit:  Lipid Visit (Submitted on 4/28/2025)  Chief Complaint: Chronic problems general questions HPI Form  Are you regularly taking any medication or supplement to lower your cholesterol?: No  Are you having muscle aches or other side effects that you think could be caused by your cholesterol lowering medication?: No  Patient Health Questionnaire (Submitted on 4/28/2025)  If you checked off any problems, how difficult have these problems made it for you to do your work, take care of things at home, or get along with other people?: Not difficult at all  PHQ9 TOTAL SCORE: 0  Patient Health Questionnaire (G7) (Submitted on 4/29/2025)  JUJU 7 TOTAL SCORE: 0  General Questionnaire (Submitted on 4/28/2025)  Chief Complaint: Chronic problems general questions HPI Form  What is the reason for your visit today? : Post-pneumonia checkup. Fasting for blood draw / check cholesterol.  How many servings of fruits and vegetables do you eat daily?: 4 or more  On average, how many sweetened beverages do you drink each day (Examples: soda, juice, sweet tea, etc.  Do NOT count diet or artificially sweetened beverages)?: 0  How many minutes a day do you exercise enough to make your heart beat faster?: 30 to 60  How many days a week do you exercise enough to make your heart beat faster?: 4  How many days per week do you miss taking your medication?: 0  Questionnaire about: Chronic problems general questions HPI Form (Submitted on 4/28/2025)  Chief Complaint: Chronic problems general " questions HPI Form

## 2025-06-09 DIAGNOSIS — J45.30 MILD PERSISTENT ASTHMA WITHOUT COMPLICATION: ICD-10-CM

## 2025-06-09 DIAGNOSIS — R05.1 ACUTE COUGH: ICD-10-CM

## 2025-06-10 RX ORDER — FLUTICASONE PROPIONATE AND SALMETEROL 100; 50 UG/1; UG/1
1 POWDER RESPIRATORY (INHALATION) EVERY 12 HOURS
Qty: 60 EACH | Refills: 5 | Status: SHIPPED | OUTPATIENT
Start: 2025-06-10

## 2025-06-17 ENCOUNTER — TRANSFERRED RECORDS (OUTPATIENT)
Dept: HEALTH INFORMATION MANAGEMENT | Facility: CLINIC | Age: 55
End: 2025-06-17
Payer: COMMERCIAL

## 2025-07-06 ENCOUNTER — TRANSFERRED RECORDS (OUTPATIENT)
Dept: HEALTH INFORMATION MANAGEMENT | Facility: CLINIC | Age: 55
End: 2025-07-06
Payer: COMMERCIAL

## 2025-07-09 ENCOUNTER — TRANSFERRED RECORDS (OUTPATIENT)
Dept: HEALTH INFORMATION MANAGEMENT | Facility: CLINIC | Age: 55
End: 2025-07-09
Payer: COMMERCIAL

## 2025-07-09 ENCOUNTER — TELEPHONE (OUTPATIENT)
Dept: FAMILY MEDICINE | Facility: CLINIC | Age: 55
End: 2025-07-09

## 2025-07-30 ENCOUNTER — TRANSFERRED RECORDS (OUTPATIENT)
Dept: HEALTH INFORMATION MANAGEMENT | Facility: CLINIC | Age: 55
End: 2025-07-30
Payer: COMMERCIAL